# Patient Record
Sex: MALE | Race: WHITE | Employment: OTHER | URBAN - METROPOLITAN AREA
[De-identification: names, ages, dates, MRNs, and addresses within clinical notes are randomized per-mention and may not be internally consistent; named-entity substitution may affect disease eponyms.]

---

## 2020-01-01 ENCOUNTER — APPOINTMENT (OUTPATIENT)
Dept: OCCUPATIONAL THERAPY | Facility: CLINIC | Age: 85
DRG: 309 | End: 2020-01-01
Attending: HOSPITALIST
Payer: COMMERCIAL

## 2020-01-01 ENCOUNTER — HOSPITAL ENCOUNTER (INPATIENT)
Facility: CLINIC | Age: 85
LOS: 4 days | DRG: 284 | End: 2020-02-05
Attending: EMERGENCY MEDICINE | Admitting: INTERNAL MEDICINE
Payer: COMMERCIAL

## 2020-01-01 ENCOUNTER — DOCUMENTATION ONLY (OUTPATIENT)
Dept: OTHER | Facility: CLINIC | Age: 85
End: 2020-01-01

## 2020-01-01 ENCOUNTER — APPOINTMENT (OUTPATIENT)
Dept: CT IMAGING | Facility: CLINIC | Age: 85
DRG: 309 | End: 2020-01-01
Attending: EMERGENCY MEDICINE
Payer: COMMERCIAL

## 2020-01-01 ENCOUNTER — HOSPITAL ENCOUNTER (INPATIENT)
Facility: CLINIC | Age: 85
LOS: 3 days | Discharge: HOME HEALTH | DRG: 309 | End: 2020-01-20
Attending: EMERGENCY MEDICINE | Admitting: INTERNAL MEDICINE
Payer: COMMERCIAL

## 2020-01-01 ENCOUNTER — NURSE TRIAGE (OUTPATIENT)
Dept: NURSING | Facility: CLINIC | Age: 85
End: 2020-01-01

## 2020-01-01 ENCOUNTER — APPOINTMENT (OUTPATIENT)
Dept: CARDIOLOGY | Facility: CLINIC | Age: 85
DRG: 309 | End: 2020-01-01
Attending: HOSPITALIST
Payer: COMMERCIAL

## 2020-01-01 ENCOUNTER — SURGERY (OUTPATIENT)
Age: 85
End: 2020-01-01
Payer: COMMERCIAL

## 2020-01-01 ENCOUNTER — APPOINTMENT (OUTPATIENT)
Dept: CT IMAGING | Facility: CLINIC | Age: 85
DRG: 284 | End: 2020-01-01
Attending: EMERGENCY MEDICINE
Payer: COMMERCIAL

## 2020-01-01 ENCOUNTER — APPOINTMENT (OUTPATIENT)
Dept: CARDIOLOGY | Facility: CLINIC | Age: 85
DRG: 284 | End: 2020-01-01
Attending: INTERNAL MEDICINE
Payer: COMMERCIAL

## 2020-01-01 ENCOUNTER — HOSPITAL ENCOUNTER (EMERGENCY)
Facility: CLINIC | Age: 85
Discharge: HOME | DRG: 284 | End: 2020-01-30
Attending: EMERGENCY MEDICINE | Admitting: EMERGENCY MEDICINE
Payer: COMMERCIAL

## 2020-01-01 ENCOUNTER — APPOINTMENT (OUTPATIENT)
Dept: GENERAL RADIOLOGY | Facility: CLINIC | Age: 85
DRG: 284 | End: 2020-01-01
Attending: EMERGENCY MEDICINE
Payer: COMMERCIAL

## 2020-01-01 ENCOUNTER — APPOINTMENT (OUTPATIENT)
Dept: OCCUPATIONAL THERAPY | Facility: CLINIC | Age: 85
DRG: 309 | End: 2020-01-01
Payer: COMMERCIAL

## 2020-01-01 ENCOUNTER — APPOINTMENT (OUTPATIENT)
Dept: CT IMAGING | Facility: CLINIC | Age: 85
DRG: 284 | End: 2020-01-01
Attending: INTERNAL MEDICINE
Payer: COMMERCIAL

## 2020-01-01 ENCOUNTER — APPOINTMENT (OUTPATIENT)
Dept: GENERAL RADIOLOGY | Facility: CLINIC | Age: 85
DRG: 309 | End: 2020-01-01
Attending: HOSPITALIST
Payer: COMMERCIAL

## 2020-01-01 VITALS
TEMPERATURE: 97.6 F | OXYGEN SATURATION: 96 % | HEART RATE: 51 BPM | RESPIRATION RATE: 18 BRPM | DIASTOLIC BLOOD PRESSURE: 69 MMHG | SYSTOLIC BLOOD PRESSURE: 153 MMHG

## 2020-01-01 VITALS
BODY MASS INDEX: 18.71 KG/M2 | HEART RATE: 61 BPM | WEIGHT: 116.4 LBS | OXYGEN SATURATION: 87 % | TEMPERATURE: 98.7 F | RESPIRATION RATE: 6 BRPM | HEIGHT: 66 IN | DIASTOLIC BLOOD PRESSURE: 63 MMHG | SYSTOLIC BLOOD PRESSURE: 116 MMHG

## 2020-01-01 VITALS
SYSTOLIC BLOOD PRESSURE: 165 MMHG | BODY MASS INDEX: 18.54 KG/M2 | WEIGHT: 111.4 LBS | HEART RATE: 55 BPM | RESPIRATION RATE: 16 BRPM | TEMPERATURE: 97.7 F | OXYGEN SATURATION: 97 % | DIASTOLIC BLOOD PRESSURE: 78 MMHG

## 2020-01-01 DIAGNOSIS — F03.90 DEMENTIA WITHOUT BEHAVIORAL DISTURBANCE, UNSPECIFIED DEMENTIA TYPE: ICD-10-CM

## 2020-01-01 DIAGNOSIS — R79.89 ELEVATED TROPONIN: ICD-10-CM

## 2020-01-01 DIAGNOSIS — R53.81 PHYSICAL DECONDITIONING: Primary | ICD-10-CM

## 2020-01-01 DIAGNOSIS — I45.5 SINUS ARREST: ICD-10-CM

## 2020-01-01 DIAGNOSIS — R00.1 BRADYCARDIA: ICD-10-CM

## 2020-01-01 DIAGNOSIS — M62.81 GENERALIZED MUSCLE WEAKNESS: ICD-10-CM

## 2020-01-01 DIAGNOSIS — I49.5 SICK SINUS SYNDROME (H): ICD-10-CM

## 2020-01-01 DIAGNOSIS — R07.89 CHEST PRESSURE: ICD-10-CM

## 2020-01-01 DIAGNOSIS — I49.5 SINUS NODE DYSFUNCTION (H): ICD-10-CM

## 2020-01-01 LAB
ALBUMIN SERPL-MCNC: 2.8 G/DL (ref 3.4–5)
ALBUMIN SERPL-MCNC: 3.1 G/DL (ref 3.4–5)
ALBUMIN SERPL-MCNC: 3.6 G/DL (ref 3.4–5)
ALBUMIN UR-MCNC: NEGATIVE MG/DL
ALBUMIN UR-MCNC: NEGATIVE MG/DL
ALP SERPL-CCNC: 101 U/L (ref 40–150)
ALP SERPL-CCNC: 81 U/L (ref 40–150)
ALP SERPL-CCNC: 95 U/L (ref 40–150)
ALT SERPL W P-5'-P-CCNC: 19 U/L (ref 0–70)
ALT SERPL W P-5'-P-CCNC: 20 U/L (ref 0–70)
ALT SERPL W P-5'-P-CCNC: 30 U/L (ref 0–70)
ANION GAP SERPL CALCULATED.3IONS-SCNC: 2 MMOL/L (ref 3–14)
ANION GAP SERPL CALCULATED.3IONS-SCNC: 2 MMOL/L (ref 3–14)
ANION GAP SERPL CALCULATED.3IONS-SCNC: 3 MMOL/L (ref 3–14)
ANION GAP SERPL CALCULATED.3IONS-SCNC: 4 MMOL/L (ref 3–14)
ANION GAP SERPL CALCULATED.3IONS-SCNC: 5 MMOL/L (ref 3–14)
ANION GAP SERPL CALCULATED.3IONS-SCNC: 5 MMOL/L (ref 3–14)
APPEARANCE UR: CLEAR
APPEARANCE UR: CLEAR
AST SERPL W P-5'-P-CCNC: 12 U/L (ref 0–45)
AST SERPL W P-5'-P-CCNC: 24 U/L (ref 0–45)
AST SERPL W P-5'-P-CCNC: 25 U/L (ref 0–45)
BASOPHILS # BLD AUTO: 0 10E9/L (ref 0–0.2)
BASOPHILS # BLD AUTO: 0 10E9/L (ref 0–0.2)
BASOPHILS # BLD AUTO: 0.1 10E9/L (ref 0–0.2)
BASOPHILS NFR BLD AUTO: 0.2 %
BASOPHILS NFR BLD AUTO: 0.6 %
BASOPHILS NFR BLD AUTO: 1 %
BILIRUB SERPL-MCNC: 0.4 MG/DL (ref 0.2–1.3)
BILIRUB SERPL-MCNC: 0.6 MG/DL (ref 0.2–1.3)
BILIRUB SERPL-MCNC: 0.7 MG/DL (ref 0.2–1.3)
BILIRUB UR QL STRIP: NEGATIVE
BILIRUB UR QL STRIP: NEGATIVE
BUN SERPL-MCNC: 14 MG/DL (ref 7–30)
BUN SERPL-MCNC: 14 MG/DL (ref 7–30)
BUN SERPL-MCNC: 15 MG/DL (ref 7–30)
BUN SERPL-MCNC: 19 MG/DL (ref 7–30)
BUN SERPL-MCNC: 22 MG/DL (ref 7–30)
BUN SERPL-MCNC: 35 MG/DL (ref 7–30)
BUN SERPL-MCNC: 39 MG/DL (ref 7–30)
BUN SERPL-MCNC: 42 MG/DL (ref 7–30)
CALCIUM SERPL-MCNC: 8 MG/DL (ref 8.5–10.1)
CALCIUM SERPL-MCNC: 8.1 MG/DL (ref 8.5–10.1)
CALCIUM SERPL-MCNC: 8.2 MG/DL (ref 8.5–10.1)
CALCIUM SERPL-MCNC: 8.3 MG/DL (ref 8.5–10.1)
CALCIUM SERPL-MCNC: 8.3 MG/DL (ref 8.5–10.1)
CALCIUM SERPL-MCNC: 8.5 MG/DL (ref 8.5–10.1)
CALCIUM SERPL-MCNC: 8.7 MG/DL (ref 8.5–10.1)
CALCIUM SERPL-MCNC: 9 MG/DL (ref 8.5–10.1)
CHLORIDE SERPL-SCNC: 107 MMOL/L (ref 94–109)
CHLORIDE SERPL-SCNC: 108 MMOL/L (ref 94–109)
CHLORIDE SERPL-SCNC: 109 MMOL/L (ref 94–109)
CHLORIDE SERPL-SCNC: 109 MMOL/L (ref 94–109)
CHLORIDE SERPL-SCNC: 110 MMOL/L (ref 94–109)
CHLORIDE SERPL-SCNC: 111 MMOL/L (ref 94–109)
CHLORIDE SERPL-SCNC: 111 MMOL/L (ref 94–109)
CHLORIDE SERPL-SCNC: 113 MMOL/L (ref 94–109)
CK SERPL-CCNC: 141 U/L (ref 30–300)
CO2 SERPL-SCNC: 26 MMOL/L (ref 20–32)
CO2 SERPL-SCNC: 26 MMOL/L (ref 20–32)
CO2 SERPL-SCNC: 27 MMOL/L (ref 20–32)
CO2 SERPL-SCNC: 28 MMOL/L (ref 20–32)
CO2 SERPL-SCNC: 28 MMOL/L (ref 20–32)
CO2 SERPL-SCNC: 29 MMOL/L (ref 20–32)
CO2 SERPL-SCNC: 29 MMOL/L (ref 20–32)
CO2 SERPL-SCNC: 30 MMOL/L (ref 20–32)
COLOR UR AUTO: NORMAL
COLOR UR AUTO: YELLOW
CREAT SERPL-MCNC: 0.95 MG/DL (ref 0.66–1.25)
CREAT SERPL-MCNC: 1.04 MG/DL (ref 0.66–1.25)
CREAT SERPL-MCNC: 1.05 MG/DL (ref 0.66–1.25)
CREAT SERPL-MCNC: 1.1 MG/DL (ref 0.66–1.25)
CREAT SERPL-MCNC: 1.21 MG/DL (ref 0.66–1.25)
CREAT SERPL-MCNC: 1.38 MG/DL (ref 0.66–1.25)
CREAT SERPL-MCNC: 1.38 MG/DL (ref 0.66–1.25)
CREAT SERPL-MCNC: 1.95 MG/DL (ref 0.66–1.25)
DIFFERENTIAL METHOD BLD: ABNORMAL
EOSINOPHIL # BLD AUTO: 0.1 10E9/L (ref 0–0.7)
EOSINOPHIL # BLD AUTO: 0.2 10E9/L (ref 0–0.7)
EOSINOPHIL # BLD AUTO: 0.2 10E9/L (ref 0–0.7)
EOSINOPHIL NFR BLD AUTO: 1 %
EOSINOPHIL NFR BLD AUTO: 3 %
EOSINOPHIL NFR BLD AUTO: 4.6 %
ERYTHROCYTE [DISTWIDTH] IN BLOOD BY AUTOMATED COUNT: 13.2 % (ref 10–15)
ERYTHROCYTE [DISTWIDTH] IN BLOOD BY AUTOMATED COUNT: 13.3 % (ref 10–15)
ERYTHROCYTE [DISTWIDTH] IN BLOOD BY AUTOMATED COUNT: 13.4 % (ref 10–15)
ERYTHROCYTE [DISTWIDTH] IN BLOOD BY AUTOMATED COUNT: 13.4 % (ref 10–15)
ERYTHROCYTE [DISTWIDTH] IN BLOOD BY AUTOMATED COUNT: 13.6 % (ref 10–15)
GFR SERPL CREATININE-BSD FRML MDRD: 29 ML/MIN/{1.73_M2}
GFR SERPL CREATININE-BSD FRML MDRD: 44 ML/MIN/{1.73_M2}
GFR SERPL CREATININE-BSD FRML MDRD: 44 ML/MIN/{1.73_M2}
GFR SERPL CREATININE-BSD FRML MDRD: 52 ML/MIN/{1.73_M2}
GFR SERPL CREATININE-BSD FRML MDRD: 58 ML/MIN/{1.73_M2}
GFR SERPL CREATININE-BSD FRML MDRD: 61 ML/MIN/{1.73_M2}
GFR SERPL CREATININE-BSD FRML MDRD: 62 ML/MIN/{1.73_M2}
GFR SERPL CREATININE-BSD FRML MDRD: 69 ML/MIN/{1.73_M2}
GLUCOSE SERPL-MCNC: 112 MG/DL (ref 70–99)
GLUCOSE SERPL-MCNC: 114 MG/DL (ref 70–99)
GLUCOSE SERPL-MCNC: 122 MG/DL (ref 70–99)
GLUCOSE SERPL-MCNC: 137 MG/DL (ref 70–99)
GLUCOSE SERPL-MCNC: 81 MG/DL (ref 70–99)
GLUCOSE SERPL-MCNC: 85 MG/DL (ref 70–99)
GLUCOSE SERPL-MCNC: 91 MG/DL (ref 70–99)
GLUCOSE SERPL-MCNC: 93 MG/DL (ref 70–99)
GLUCOSE UR STRIP-MCNC: NEGATIVE MG/DL
GLUCOSE UR STRIP-MCNC: NEGATIVE MG/DL
HCT VFR BLD AUTO: 36.4 % (ref 40–53)
HCT VFR BLD AUTO: 36.5 % (ref 40–53)
HCT VFR BLD AUTO: 37.1 % (ref 40–53)
HCT VFR BLD AUTO: 38 % (ref 40–53)
HCT VFR BLD AUTO: 40.4 % (ref 40–53)
HGB BLD-MCNC: 11.9 G/DL (ref 13.3–17.7)
HGB BLD-MCNC: 12 G/DL (ref 13.3–17.7)
HGB BLD-MCNC: 12.6 G/DL (ref 13.3–17.7)
HGB BLD-MCNC: 13.2 G/DL (ref 13.3–17.7)
HGB UR QL STRIP: NEGATIVE
HGB UR QL STRIP: NEGATIVE
IMM GRANULOCYTES # BLD: 0 10E9/L (ref 0–0.4)
IMM GRANULOCYTES NFR BLD: 0.2 %
INTERPRETATION ECG - MUSE: NORMAL
KETONES UR STRIP-MCNC: NEGATIVE MG/DL
KETONES UR STRIP-MCNC: NEGATIVE MG/DL
LACTATE BLD-SCNC: 1.2 MMOL/L (ref 0.7–2)
LEUKOCYTE ESTERASE UR QL STRIP: NEGATIVE
LEUKOCYTE ESTERASE UR QL STRIP: NEGATIVE
LMWH PPP CHRO-ACNC: 0.13 IU/ML
LMWH PPP CHRO-ACNC: 0.31 IU/ML
LMWH PPP CHRO-ACNC: 0.32 IU/ML
LYMPHOCYTES # BLD AUTO: 1 10E9/L (ref 0.8–5.3)
LYMPHOCYTES # BLD AUTO: 1.1 10E9/L (ref 0.8–5.3)
LYMPHOCYTES # BLD AUTO: 1.6 10E9/L (ref 0.8–5.3)
LYMPHOCYTES NFR BLD AUTO: 10.9 %
LYMPHOCYTES NFR BLD AUTO: 20.9 %
LYMPHOCYTES NFR BLD AUTO: 30.2 %
MAGNESIUM SERPL-MCNC: 2.1 MG/DL (ref 1.6–2.3)
MAGNESIUM SERPL-MCNC: 2.3 MG/DL (ref 1.6–2.3)
MCH RBC QN AUTO: 30.7 PG (ref 26.5–33)
MCH RBC QN AUTO: 30.8 PG (ref 26.5–33)
MCH RBC QN AUTO: 30.9 PG (ref 26.5–33)
MCH RBC QN AUTO: 31.2 PG (ref 26.5–33)
MCH RBC QN AUTO: 31.3 PG (ref 26.5–33)
MCHC RBC AUTO-ENTMCNC: 32.3 G/DL (ref 31.5–36.5)
MCHC RBC AUTO-ENTMCNC: 32.6 G/DL (ref 31.5–36.5)
MCHC RBC AUTO-ENTMCNC: 32.7 G/DL (ref 31.5–36.5)
MCHC RBC AUTO-ENTMCNC: 33 G/DL (ref 31.5–36.5)
MCHC RBC AUTO-ENTMCNC: 33.2 G/DL (ref 31.5–36.5)
MCV RBC AUTO: 94 FL (ref 78–100)
MCV RBC AUTO: 95 FL (ref 78–100)
MONOCYTES # BLD AUTO: 0.5 10E9/L (ref 0–1.3)
MONOCYTES # BLD AUTO: 0.6 10E9/L (ref 0–1.3)
MONOCYTES # BLD AUTO: 0.7 10E9/L (ref 0–1.3)
MONOCYTES NFR BLD AUTO: 10.1 %
MONOCYTES NFR BLD AUTO: 10.4 %
MONOCYTES NFR BLD AUTO: 7.6 %
NEUTROPHILS # BLD AUTO: 2.8 10E9/L (ref 1.6–8.3)
NEUTROPHILS # BLD AUTO: 3.5 10E9/L (ref 1.6–8.3)
NEUTROPHILS # BLD AUTO: 7.2 10E9/L (ref 1.6–8.3)
NEUTROPHILS NFR BLD AUTO: 53.9 %
NEUTROPHILS NFR BLD AUTO: 64.9 %
NEUTROPHILS NFR BLD AUTO: 80.1 %
NITRATE UR QL: NEGATIVE
NITRATE UR QL: NEGATIVE
NRBC # BLD AUTO: 0 10*3/UL
NRBC # BLD AUTO: 0 10*3/UL
NRBC BLD AUTO-RTO: 0 /100
NRBC BLD AUTO-RTO: 0 /100
NT-PROBNP SERPL-MCNC: 2000 PG/ML (ref 0–1800)
PH UR STRIP: 6.5 PH (ref 5–7)
PH UR STRIP: 7 PH (ref 5–7)
PLATELET # BLD AUTO: 180 10E9/L (ref 150–450)
PLATELET # BLD AUTO: 182 10E9/L (ref 150–450)
PLATELET # BLD AUTO: 194 10E9/L (ref 150–450)
PLATELET # BLD AUTO: 203 10E9/L (ref 150–450)
PLATELET # BLD AUTO: 210 10E9/L (ref 150–450)
POTASSIUM SERPL-SCNC: 3.9 MMOL/L (ref 3.4–5.3)
POTASSIUM SERPL-SCNC: 4 MMOL/L (ref 3.4–5.3)
POTASSIUM SERPL-SCNC: 4.2 MMOL/L (ref 3.4–5.3)
POTASSIUM SERPL-SCNC: 4.4 MMOL/L (ref 3.4–5.3)
POTASSIUM SERPL-SCNC: 4.4 MMOL/L (ref 3.4–5.3)
POTASSIUM SERPL-SCNC: 4.6 MMOL/L (ref 3.4–5.3)
POTASSIUM SERPL-SCNC: 4.7 MMOL/L (ref 3.4–5.3)
POTASSIUM SERPL-SCNC: 4.7 MMOL/L (ref 3.4–5.3)
PROT SERPL-MCNC: 5.7 G/DL (ref 6.8–8.8)
PROT SERPL-MCNC: 6.2 G/DL (ref 6.8–8.8)
PROT SERPL-MCNC: 6.5 G/DL (ref 6.8–8.8)
RBC # BLD AUTO: 3.85 10E12/L (ref 4.4–5.9)
RBC # BLD AUTO: 3.87 10E12/L (ref 4.4–5.9)
RBC # BLD AUTO: 3.9 10E12/L (ref 4.4–5.9)
RBC # BLD AUTO: 4.02 10E12/L (ref 4.4–5.9)
RBC # BLD AUTO: 4.27 10E12/L (ref 4.4–5.9)
RBC #/AREA URNS AUTO: 0 /HPF (ref 0–2)
RBC #/AREA URNS AUTO: <1 /HPF (ref 0–2)
SODIUM SERPL-SCNC: 139 MMOL/L (ref 133–144)
SODIUM SERPL-SCNC: 140 MMOL/L (ref 133–144)
SODIUM SERPL-SCNC: 141 MMOL/L (ref 133–144)
SODIUM SERPL-SCNC: 142 MMOL/L (ref 133–144)
SODIUM SERPL-SCNC: 142 MMOL/L (ref 133–144)
SODIUM SERPL-SCNC: 144 MMOL/L (ref 133–144)
SOURCE: NORMAL
SOURCE: NORMAL
SP GR UR STRIP: 1.01 (ref 1–1.03)
SP GR UR STRIP: 1.01 (ref 1–1.03)
SQUAMOUS #/AREA URNS AUTO: <1 /HPF (ref 0–1)
TROPONIN I SERPL-MCNC: 5.42 UG/L (ref 0–0.04)
TROPONIN I SERPL-MCNC: 6.9 UG/L (ref 0–0.04)
TROPONIN I SERPL-MCNC: 7.89 UG/L (ref 0–0.04)
TROPONIN I SERPL-MCNC: <0.015 UG/L (ref 0–0.04)
TROPONIN I SERPL-MCNC: <0.015 UG/L (ref 0–0.04)
TSH SERPL DL<=0.005 MIU/L-ACNC: 2.37 MU/L (ref 0.4–4)
UROBILINOGEN UR STRIP-MCNC: NORMAL MG/DL (ref 0–2)
UROBILINOGEN UR STRIP-MCNC: NORMAL MG/DL (ref 0–2)
WBC # BLD AUTO: 5.2 10E9/L (ref 4–11)
WBC # BLD AUTO: 5.4 10E9/L (ref 4–11)
WBC # BLD AUTO: 7.1 10E9/L (ref 4–11)
WBC # BLD AUTO: 8.1 10E9/L (ref 4–11)
WBC # BLD AUTO: 8.9 10E9/L (ref 4–11)
WBC #/AREA URNS AUTO: <1 /HPF (ref 0–5)
WBC #/AREA URNS AUTO: <1 /HPF (ref 0–5)

## 2020-01-01 PROCEDURE — 25000132 ZZH RX MED GY IP 250 OP 250 PS 637: Performed by: INTERNAL MEDICINE

## 2020-01-01 PROCEDURE — 21000001 ZZH R&B HEART CARE

## 2020-01-01 PROCEDURE — 85025 COMPLETE CBC W/AUTO DIFF WBC: CPT | Performed by: EMERGENCY MEDICINE

## 2020-01-01 PROCEDURE — 5A1223Z PERFORMANCE OF CARDIAC PACING, CONTINUOUS: ICD-10-PCS | Performed by: INTERNAL MEDICINE

## 2020-01-01 PROCEDURE — 71046 X-RAY EXAM CHEST 2 VIEWS: CPT

## 2020-01-01 PROCEDURE — 83735 ASSAY OF MAGNESIUM: CPT | Performed by: HOSPITALIST

## 2020-01-01 PROCEDURE — 99223 1ST HOSP IP/OBS HIGH 75: CPT | Mod: AI | Performed by: INTERNAL MEDICINE

## 2020-01-01 PROCEDURE — 27210794 ZZH OR GENERAL SUPPLY STERILE: Performed by: INTERNAL MEDICINE

## 2020-01-01 PROCEDURE — 70450 CT HEAD/BRAIN W/O DYE: CPT

## 2020-01-01 PROCEDURE — 99222 1ST HOSP IP/OBS MODERATE 55: CPT | Mod: AI | Performed by: INTERNAL MEDICINE

## 2020-01-01 PROCEDURE — 84484 ASSAY OF TROPONIN QUANT: CPT | Performed by: EMERGENCY MEDICINE

## 2020-01-01 PROCEDURE — 85520 HEPARIN ASSAY: CPT | Performed by: INTERNAL MEDICINE

## 2020-01-01 PROCEDURE — 25000128 H RX IP 250 OP 636: Performed by: INTERNAL MEDICINE

## 2020-01-01 PROCEDURE — 93306 TTE W/DOPPLER COMPLETE: CPT | Mod: 26 | Performed by: INTERNAL MEDICINE

## 2020-01-01 PROCEDURE — 25000128 H RX IP 250 OP 636: Performed by: NURSE PRACTITIONER

## 2020-01-01 PROCEDURE — 25000132 ZZH RX MED GY IP 250 OP 250 PS 637: Performed by: NURSE PRACTITIONER

## 2020-01-01 PROCEDURE — 40000264 ECHOCARDIOGRAM LIMITED

## 2020-01-01 PROCEDURE — 99207 ZZC APP CREDIT; MD BILLING SHARED VISIT: CPT | Performed by: NURSE PRACTITIONER

## 2020-01-01 PROCEDURE — 99222 1ST HOSP IP/OBS MODERATE 55: CPT | Performed by: HOSPITALIST

## 2020-01-01 PROCEDURE — 25000132 ZZH RX MED GY IP 250 OP 250 PS 637: Performed by: HOSPITALIST

## 2020-01-01 PROCEDURE — 25800030 ZZH RX IP 258 OP 636: Performed by: INTERNAL MEDICINE

## 2020-01-01 PROCEDURE — 25000132 ZZH RX MED GY IP 250 OP 250 PS 637: Performed by: EMERGENCY MEDICINE

## 2020-01-01 PROCEDURE — 99233 SBSQ HOSP IP/OBS HIGH 50: CPT | Mod: 25 | Performed by: INTERNAL MEDICINE

## 2020-01-01 PROCEDURE — 96366 THER/PROPH/DIAG IV INF ADDON: CPT

## 2020-01-01 PROCEDURE — 80048 BASIC METABOLIC PNL TOTAL CA: CPT | Performed by: NURSE PRACTITIONER

## 2020-01-01 PROCEDURE — 99285 EMERGENCY DEPT VISIT HI MDM: CPT | Mod: 25

## 2020-01-01 PROCEDURE — 99232 SBSQ HOSP IP/OBS MODERATE 35: CPT | Performed by: INTERNAL MEDICINE

## 2020-01-01 PROCEDURE — 93005 ELECTROCARDIOGRAM TRACING: CPT

## 2020-01-01 PROCEDURE — 36415 COLL VENOUS BLD VENIPUNCTURE: CPT | Performed by: HOSPITALIST

## 2020-01-01 PROCEDURE — 36415 COLL VENOUS BLD VENIPUNCTURE: CPT | Performed by: INTERNAL MEDICINE

## 2020-01-01 PROCEDURE — 93325 DOPPLER ECHO COLOR FLOW MAPG: CPT | Mod: 26 | Performed by: INTERNAL MEDICINE

## 2020-01-01 PROCEDURE — 12000000 ZZH R&B MED SURG/OB

## 2020-01-01 PROCEDURE — 80048 BASIC METABOLIC PNL TOTAL CA: CPT | Performed by: INTERNAL MEDICINE

## 2020-01-01 PROCEDURE — 99207 ZZC CDG-CHARGE REQUIRED MANUAL ENTRY: CPT | Performed by: NURSE PRACTITIONER

## 2020-01-01 PROCEDURE — 33210 INSERT ELECTRD/PM CATH SNGL: CPT | Performed by: INTERNAL MEDICINE

## 2020-01-01 PROCEDURE — 83735 ASSAY OF MAGNESIUM: CPT | Performed by: NURSE PRACTITIONER

## 2020-01-01 PROCEDURE — 36415 COLL VENOUS BLD VENIPUNCTURE: CPT | Performed by: NURSE PRACTITIONER

## 2020-01-01 PROCEDURE — 20000003 ZZH R&B ICU

## 2020-01-01 PROCEDURE — 97165 OT EVAL LOW COMPLEX 30 MIN: CPT | Mod: GO

## 2020-01-01 PROCEDURE — 80053 COMPREHEN METABOLIC PANEL: CPT | Performed by: EMERGENCY MEDICINE

## 2020-01-01 PROCEDURE — 3E043XZ INTRODUCTION OF VASOPRESSOR INTO CENTRAL VEIN, PERCUTANEOUS APPROACH: ICD-10-PCS | Performed by: INTERNAL MEDICINE

## 2020-01-01 PROCEDURE — 25000125 ZZHC RX 250: Performed by: INTERNAL MEDICINE

## 2020-01-01 PROCEDURE — 81001 URINALYSIS AUTO W/SCOPE: CPT | Performed by: EMERGENCY MEDICINE

## 2020-01-01 PROCEDURE — 99233 SBSQ HOSP IP/OBS HIGH 50: CPT | Performed by: INTERNAL MEDICINE

## 2020-01-01 PROCEDURE — 97535 SELF CARE MNGMENT TRAINING: CPT | Mod: GO

## 2020-01-01 PROCEDURE — 99291 CRITICAL CARE FIRST HOUR: CPT | Performed by: NURSE PRACTITIONER

## 2020-01-01 PROCEDURE — C1730 CATH, EP, 19 OR FEW ELECT: HCPCS | Performed by: INTERNAL MEDICINE

## 2020-01-01 PROCEDURE — 99238 HOSP IP/OBS DSCHRG MGMT 30/<: CPT | Performed by: NURSE PRACTITIONER

## 2020-01-01 PROCEDURE — 97535 SELF CARE MNGMENT TRAINING: CPT | Mod: GO | Performed by: OCCUPATIONAL THERAPIST

## 2020-01-01 PROCEDURE — 83735 ASSAY OF MAGNESIUM: CPT | Performed by: INTERNAL MEDICINE

## 2020-01-01 PROCEDURE — 83605 ASSAY OF LACTIC ACID: CPT | Performed by: INTERNAL MEDICINE

## 2020-01-01 PROCEDURE — 80048 BASIC METABOLIC PNL TOTAL CA: CPT | Performed by: HOSPITALIST

## 2020-01-01 PROCEDURE — 82550 ASSAY OF CK (CPK): CPT | Performed by: INTERNAL MEDICINE

## 2020-01-01 PROCEDURE — 25800030 ZZH RX IP 258 OP 636: Performed by: HOSPITALIST

## 2020-01-01 PROCEDURE — 25000128 H RX IP 250 OP 636

## 2020-01-01 PROCEDURE — 93308 TTE F-UP OR LMTD: CPT | Mod: 26 | Performed by: INTERNAL MEDICINE

## 2020-01-01 PROCEDURE — 93321 DOPPLER ECHO F-UP/LMTD STD: CPT | Mod: 26 | Performed by: INTERNAL MEDICINE

## 2020-01-01 PROCEDURE — 80053 COMPREHEN METABOLIC PANEL: CPT | Performed by: HOSPITALIST

## 2020-01-01 PROCEDURE — 84443 ASSAY THYROID STIM HORMONE: CPT | Performed by: INTERNAL MEDICINE

## 2020-01-01 PROCEDURE — 93306 TTE W/DOPPLER COMPLETE: CPT

## 2020-01-01 PROCEDURE — 99221 1ST HOSP IP/OBS SF/LOW 40: CPT | Mod: 25 | Performed by: INTERNAL MEDICINE

## 2020-01-01 PROCEDURE — 83880 ASSAY OF NATRIURETIC PEPTIDE: CPT | Performed by: EMERGENCY MEDICINE

## 2020-01-01 PROCEDURE — 25000128 H RX IP 250 OP 636: Performed by: EMERGENCY MEDICINE

## 2020-01-01 PROCEDURE — 99239 HOSP IP/OBS DSCHRG MGMT >30: CPT | Performed by: HOSPITALIST

## 2020-01-01 PROCEDURE — 96365 THER/PROPH/DIAG IV INF INIT: CPT

## 2020-01-01 PROCEDURE — 99207 ZZC APP CREDIT; MD BILLING SHARED VISIT: CPT | Performed by: INTERNAL MEDICINE

## 2020-01-01 PROCEDURE — 33210 INSERT ELECTRD/PM CATH SNGL: CPT | Mod: GC | Performed by: INTERNAL MEDICINE

## 2020-01-01 PROCEDURE — 25000125 ZZHC RX 250: Performed by: NURSE PRACTITIONER

## 2020-01-01 PROCEDURE — 71275 CT ANGIOGRAPHY CHEST: CPT

## 2020-01-01 PROCEDURE — 99233 SBSQ HOSP IP/OBS HIGH 50: CPT | Performed by: HOSPITALIST

## 2020-01-01 PROCEDURE — 93005 ELECTROCARDIOGRAM TRACING: CPT | Mod: 76

## 2020-01-01 PROCEDURE — 80048 BASIC METABOLIC PNL TOTAL CA: CPT | Performed by: EMERGENCY MEDICINE

## 2020-01-01 PROCEDURE — 99207 ZZC CDG-MDM COMPONENT: MEETS MODERATE - UP CODED: CPT | Performed by: HOSPITALIST

## 2020-01-01 PROCEDURE — 84484 ASSAY OF TROPONIN QUANT: CPT | Performed by: INTERNAL MEDICINE

## 2020-01-01 PROCEDURE — 99207 ZZC NON-BILLABLE SERV PER CHARTING: CPT | Performed by: NURSE PRACTITIONER

## 2020-01-01 PROCEDURE — 99223 1ST HOSP IP/OBS HIGH 75: CPT | Performed by: NURSE PRACTITIONER

## 2020-01-01 PROCEDURE — 85027 COMPLETE CBC AUTOMATED: CPT | Performed by: INTERNAL MEDICINE

## 2020-01-01 PROCEDURE — 25500064 ZZH RX 255 OP 636: Performed by: INTERNAL MEDICINE

## 2020-01-01 PROCEDURE — 99222 1ST HOSP IP/OBS MODERATE 55: CPT | Mod: 25 | Performed by: INTERNAL MEDICINE

## 2020-01-01 PROCEDURE — 85018 HEMOGLOBIN: CPT | Performed by: HOSPITALIST

## 2020-01-01 PROCEDURE — 93010 ELECTROCARDIOGRAM REPORT: CPT | Mod: 76 | Performed by: INTERNAL MEDICINE

## 2020-01-01 PROCEDURE — 96376 TX/PRO/DX INJ SAME DRUG ADON: CPT

## 2020-01-01 RX ORDER — MORPHINE SULFATE 2 MG/ML
1-2 INJECTION, SOLUTION INTRAMUSCULAR; INTRAVENOUS
Status: DISCONTINUED | OUTPATIENT
Start: 2020-01-01 | End: 2020-02-06 | Stop reason: HOSPADM

## 2020-01-01 RX ORDER — ASPIRIN 81 MG/1
324 TABLET, CHEWABLE ORAL ONCE
Status: COMPLETED | OUTPATIENT
Start: 2020-01-01 | End: 2020-01-01

## 2020-01-01 RX ORDER — MORPHINE SULFATE 100 MG/5ML
10-20 SOLUTION ORAL
Status: DISCONTINUED | OUTPATIENT
Start: 2020-01-01 | End: 2020-02-06 | Stop reason: HOSPADM

## 2020-01-01 RX ORDER — BISACODYL 10 MG
10 SUPPOSITORY, RECTAL RECTAL DAILY PRN
Status: DISCONTINUED | OUTPATIENT
Start: 2020-01-01 | End: 2020-02-06 | Stop reason: HOSPADM

## 2020-01-01 RX ORDER — NALOXONE HYDROCHLORIDE 0.4 MG/ML
.1-.4 INJECTION, SOLUTION INTRAMUSCULAR; INTRAVENOUS; SUBCUTANEOUS
Status: DISCONTINUED | OUTPATIENT
Start: 2020-01-01 | End: 2020-01-01

## 2020-01-01 RX ORDER — HALOPERIDOL 5 MG/ML
1-2 INJECTION INTRAMUSCULAR
Status: DISCONTINUED | OUTPATIENT
Start: 2020-01-01 | End: 2020-02-06 | Stop reason: HOSPADM

## 2020-01-01 RX ORDER — ONDANSETRON 4 MG/1
4 TABLET, ORALLY DISINTEGRATING ORAL EVERY 6 HOURS PRN
Status: DISCONTINUED | OUTPATIENT
Start: 2020-01-01 | End: 2020-01-01

## 2020-01-01 RX ORDER — ACETAMINOPHEN 325 MG/1
650 TABLET ORAL ONCE
Status: COMPLETED | OUTPATIENT
Start: 2020-01-01 | End: 2020-01-01

## 2020-01-01 RX ORDER — IOPAMIDOL 755 MG/ML
80 INJECTION, SOLUTION INTRAVASCULAR ONCE
Status: DISCONTINUED | OUTPATIENT
Start: 2020-01-01 | End: 2020-01-01

## 2020-01-01 RX ORDER — AMOXICILLIN 250 MG
1 CAPSULE ORAL 2 TIMES DAILY
Status: DISCONTINUED | OUTPATIENT
Start: 2020-01-01 | End: 2020-01-01 | Stop reason: HOSPADM

## 2020-01-01 RX ORDER — FLUTICASONE PROPIONATE 50 MCG
1-2 SPRAY, SUSPENSION (ML) NASAL DAILY
COMMUNITY
Start: 2010-09-29

## 2020-01-01 RX ORDER — AMOXICILLIN 250 MG
2 CAPSULE ORAL 2 TIMES DAILY
Status: DISCONTINUED | OUTPATIENT
Start: 2020-01-01 | End: 2020-02-06 | Stop reason: HOSPADM

## 2020-01-01 RX ORDER — BISACODYL 5 MG
5 TABLET, DELAYED RELEASE (ENTERIC COATED) ORAL DAILY PRN
Status: DISCONTINUED | OUTPATIENT
Start: 2020-01-01 | End: 2020-02-06 | Stop reason: HOSPADM

## 2020-01-01 RX ORDER — ASPIRIN 81 MG/1
324 TABLET, CHEWABLE ORAL ONCE
Status: DISCONTINUED | OUTPATIENT
Start: 2020-01-01 | End: 2020-01-01 | Stop reason: CLARIF

## 2020-01-01 RX ORDER — ATROPINE SULFATE 0.1 MG/ML
0.4 INJECTION INTRAVENOUS
Status: DISCONTINUED | OUTPATIENT
Start: 2020-01-01 | End: 2020-01-01

## 2020-01-01 RX ORDER — POTASSIUM CL/LIDO/0.9 % NACL 10MEQ/0.1L
10 INTRAVENOUS SOLUTION, PIGGYBACK (ML) INTRAVENOUS
Status: DISCONTINUED | OUTPATIENT
Start: 2020-01-01 | End: 2020-01-01 | Stop reason: HOSPADM

## 2020-01-01 RX ORDER — MAGNESIUM SULFATE HEPTAHYDRATE 40 MG/ML
4 INJECTION, SOLUTION INTRAVENOUS EVERY 4 HOURS PRN
Status: DISCONTINUED | OUTPATIENT
Start: 2020-01-01 | End: 2020-01-01

## 2020-01-01 RX ORDER — ATROPINE SULFATE 0.1 MG/ML
0.5 INJECTION INTRAVENOUS
Status: DISCONTINUED | OUTPATIENT
Start: 2020-01-01 | End: 2020-01-01

## 2020-01-01 RX ORDER — NALOXONE HYDROCHLORIDE 0.4 MG/ML
.1-.4 INJECTION, SOLUTION INTRAMUSCULAR; INTRAVENOUS; SUBCUTANEOUS
Status: DISCONTINUED | OUTPATIENT
Start: 2020-01-01 | End: 2020-01-01 | Stop reason: HOSPADM

## 2020-01-01 RX ORDER — ONDANSETRON 4 MG/1
4 TABLET, ORALLY DISINTEGRATING ORAL EVERY 6 HOURS PRN
Status: DISCONTINUED | OUTPATIENT
Start: 2020-01-01 | End: 2020-01-01 | Stop reason: HOSPADM

## 2020-01-01 RX ORDER — HALOPERIDOL 2 MG/ML
1-2 SOLUTION ORAL EVERY 6 HOURS PRN
Status: DISCONTINUED | OUTPATIENT
Start: 2020-01-01 | End: 2020-02-06 | Stop reason: HOSPADM

## 2020-01-01 RX ORDER — NITROGLYCERIN 0.4 MG/1
0.4 TABLET SUBLINGUAL EVERY 5 MIN PRN
Status: DISCONTINUED | OUTPATIENT
Start: 2020-01-01 | End: 2020-01-01

## 2020-01-01 RX ORDER — ZOLPIDEM TARTRATE 5 MG/1
5 TABLET ORAL
COMMUNITY
Start: 2013-08-06 | End: 2020-01-01

## 2020-01-01 RX ORDER — MORPHINE SULFATE 10 MG/5ML
5-10 SOLUTION ORAL
Status: DISCONTINUED | OUTPATIENT
Start: 2020-01-01 | End: 2020-01-01

## 2020-01-01 RX ORDER — POTASSIUM CHLORIDE 1.5 G/1.58G
20-40 POWDER, FOR SOLUTION ORAL
Status: DISCONTINUED | OUTPATIENT
Start: 2020-01-01 | End: 2020-01-01 | Stop reason: HOSPADM

## 2020-01-01 RX ORDER — NALOXONE HYDROCHLORIDE 0.4 MG/ML
.1-.4 INJECTION, SOLUTION INTRAMUSCULAR; INTRAVENOUS; SUBCUTANEOUS
Status: DISCONTINUED | OUTPATIENT
Start: 2020-01-01 | End: 2020-02-06 | Stop reason: HOSPADM

## 2020-01-01 RX ORDER — PROCHLORPERAZINE 25 MG
12.5 SUPPOSITORY, RECTAL RECTAL EVERY 12 HOURS PRN
Status: DISCONTINUED | OUTPATIENT
Start: 2020-01-01 | End: 2020-02-06 | Stop reason: HOSPADM

## 2020-01-01 RX ORDER — ACETAMINOPHEN 500 MG
1000 TABLET ORAL ONCE
Status: COMPLETED | OUTPATIENT
Start: 2020-01-01 | End: 2020-01-01

## 2020-01-01 RX ORDER — BISACODYL 5 MG
10 TABLET, DELAYED RELEASE (ENTERIC COATED) ORAL DAILY PRN
Status: DISCONTINUED | OUTPATIENT
Start: 2020-01-01 | End: 2020-02-06 | Stop reason: HOSPADM

## 2020-01-01 RX ORDER — SIMVASTATIN 20 MG
20 TABLET ORAL EVERY EVENING
Status: DISCONTINUED | OUTPATIENT
Start: 2020-01-01 | End: 2020-01-01

## 2020-01-01 RX ORDER — LORAZEPAM 2 MG/ML
1-2 INJECTION INTRAMUSCULAR
Status: DISCONTINUED | OUTPATIENT
Start: 2020-01-01 | End: 2020-02-06 | Stop reason: HOSPADM

## 2020-01-01 RX ORDER — LORAZEPAM 0.5 MG/1
.5-1 TABLET ORAL
Status: DISCONTINUED | OUTPATIENT
Start: 2020-01-01 | End: 2020-01-01

## 2020-01-01 RX ORDER — POTASSIUM CHLORIDE 7.45 MG/ML
10 INJECTION INTRAVENOUS
Status: DISCONTINUED | OUTPATIENT
Start: 2020-01-01 | End: 2020-01-01 | Stop reason: HOSPADM

## 2020-01-01 RX ORDER — PROCHLORPERAZINE MALEATE 5 MG
5 TABLET ORAL EVERY 6 HOURS PRN
Status: DISCONTINUED | OUTPATIENT
Start: 2020-01-01 | End: 2020-02-06 | Stop reason: HOSPADM

## 2020-01-01 RX ORDER — AMOXICILLIN 250 MG
2 CAPSULE ORAL 2 TIMES DAILY
Status: DISCONTINUED | OUTPATIENT
Start: 2020-01-01 | End: 2020-01-01 | Stop reason: HOSPADM

## 2020-01-01 RX ORDER — SODIUM CHLORIDE 9 MG/ML
INJECTION, SOLUTION INTRAVENOUS CONTINUOUS
Status: DISCONTINUED | OUTPATIENT
Start: 2020-01-01 | End: 2020-01-01

## 2020-01-01 RX ORDER — POTASSIUM CHLORIDE 1500 MG/1
20-40 TABLET, EXTENDED RELEASE ORAL
Status: DISCONTINUED | OUTPATIENT
Start: 2020-01-01 | End: 2020-01-01 | Stop reason: HOSPADM

## 2020-01-01 RX ORDER — LIDOCAINE 40 MG/G
CREAM TOPICAL
Status: DISCONTINUED | OUTPATIENT
Start: 2020-01-01 | End: 2020-01-01 | Stop reason: HOSPADM

## 2020-01-01 RX ORDER — POTASSIUM CHLORIDE 29.8 MG/ML
20 INJECTION INTRAVENOUS
Status: DISCONTINUED | OUTPATIENT
Start: 2020-01-01 | End: 2020-01-01 | Stop reason: HOSPADM

## 2020-01-01 RX ORDER — LIDOCAINE 40 MG/G
CREAM TOPICAL
Status: DISCONTINUED | OUTPATIENT
Start: 2020-01-01 | End: 2020-02-06 | Stop reason: HOSPADM

## 2020-01-01 RX ORDER — POTASSIUM CHLORIDE 7.45 MG/ML
10 INJECTION INTRAVENOUS
Status: DISCONTINUED | OUTPATIENT
Start: 2020-01-01 | End: 2020-01-01

## 2020-01-01 RX ORDER — MINERAL OIL/HYDROPHIL PETROLAT
OINTMENT (GRAM) TOPICAL EVERY 8 HOURS PRN
Status: DISCONTINUED | OUTPATIENT
Start: 2020-01-01 | End: 2020-02-06 | Stop reason: HOSPADM

## 2020-01-01 RX ORDER — DOPAMINE HYDROCHLORIDE 160 MG/100ML
2-10 INJECTION, SOLUTION INTRAVENOUS CONTINUOUS
Status: DISCONTINUED | OUTPATIENT
Start: 2020-01-01 | End: 2020-01-01

## 2020-01-01 RX ORDER — ATROPINE SULFATE 10 MG/ML
1-2 SOLUTION/ DROPS OPHTHALMIC
Status: DISCONTINUED | OUTPATIENT
Start: 2020-01-01 | End: 2020-02-06 | Stop reason: HOSPADM

## 2020-01-01 RX ORDER — LORAZEPAM 2 MG/ML
.5-1 INJECTION INTRAMUSCULAR
Status: DISCONTINUED | OUTPATIENT
Start: 2020-01-01 | End: 2020-01-01

## 2020-01-01 RX ORDER — AMOXICILLIN 250 MG
1 CAPSULE ORAL 2 TIMES DAILY
Status: DISCONTINUED | OUTPATIENT
Start: 2020-01-01 | End: 2020-02-06 | Stop reason: HOSPADM

## 2020-01-01 RX ORDER — ONDANSETRON 2 MG/ML
4 INJECTION INTRAMUSCULAR; INTRAVENOUS EVERY 6 HOURS PRN
Status: DISCONTINUED | OUTPATIENT
Start: 2020-01-01 | End: 2020-01-01

## 2020-01-01 RX ORDER — ACETAMINOPHEN 500 MG
500-1000 TABLET ORAL EVERY 4 HOURS PRN
COMMUNITY

## 2020-01-01 RX ORDER — ACETAMINOPHEN 325 MG/1
650 TABLET ORAL EVERY 4 HOURS PRN
Status: DISCONTINUED | OUTPATIENT
Start: 2020-01-01 | End: 2020-01-01

## 2020-01-01 RX ORDER — POTASSIUM CHLORIDE 1.5 G/1.58G
20-40 POWDER, FOR SOLUTION ORAL
Status: DISCONTINUED | OUTPATIENT
Start: 2020-01-01 | End: 2020-01-01

## 2020-01-01 RX ORDER — ACETAMINOPHEN 325 MG/1
650 TABLET ORAL EVERY 6 HOURS PRN
Status: DISCONTINUED | OUTPATIENT
Start: 2020-01-01 | End: 2020-02-06 | Stop reason: HOSPADM

## 2020-01-01 RX ORDER — HALOPERIDOL 5 MG/ML
.5-1 INJECTION INTRAMUSCULAR
Status: DISCONTINUED | OUTPATIENT
Start: 2020-01-01 | End: 2020-01-01

## 2020-01-01 RX ORDER — IOPAMIDOL 755 MG/ML
80 INJECTION, SOLUTION INTRAVASCULAR ONCE
Status: COMPLETED | OUTPATIENT
Start: 2020-01-01 | End: 2020-01-01

## 2020-01-01 RX ORDER — OLANZAPINE 10 MG/2ML
2.5 INJECTION, POWDER, FOR SOLUTION INTRAMUSCULAR DAILY PRN
Status: DISCONTINUED | OUTPATIENT
Start: 2020-01-01 | End: 2020-02-06 | Stop reason: HOSPADM

## 2020-01-01 RX ORDER — POTASSIUM CL/LIDO/0.9 % NACL 10MEQ/0.1L
10 INTRAVENOUS SOLUTION, PIGGYBACK (ML) INTRAVENOUS
Status: DISCONTINUED | OUTPATIENT
Start: 2020-01-01 | End: 2020-01-01

## 2020-01-01 RX ORDER — ONDANSETRON 2 MG/ML
4 INJECTION INTRAMUSCULAR; INTRAVENOUS EVERY 6 HOURS PRN
Status: DISCONTINUED | OUTPATIENT
Start: 2020-01-01 | End: 2020-02-06 | Stop reason: HOSPADM

## 2020-01-01 RX ORDER — ONDANSETRON 2 MG/ML
4 INJECTION INTRAMUSCULAR; INTRAVENOUS EVERY 6 HOURS PRN
Status: DISCONTINUED | OUTPATIENT
Start: 2020-01-01 | End: 2020-01-01 | Stop reason: HOSPADM

## 2020-01-01 RX ORDER — ASPIRIN 325 MG
325 TABLET ORAL DAILY
Status: DISCONTINUED | OUTPATIENT
Start: 2020-01-01 | End: 2020-01-01

## 2020-01-01 RX ORDER — OXYCODONE HYDROCHLORIDE 5 MG/1
5 TABLET ORAL EVERY 6 HOURS PRN
Status: DISCONTINUED | OUTPATIENT
Start: 2020-01-01 | End: 2020-01-01

## 2020-01-01 RX ORDER — GLYCOPYRROLATE 0.2 MG/ML
.2-.4 INJECTION, SOLUTION INTRAMUSCULAR; INTRAVENOUS EVERY 4 HOURS PRN
Status: DISCONTINUED | OUTPATIENT
Start: 2020-01-01 | End: 2020-02-06 | Stop reason: HOSPADM

## 2020-01-01 RX ORDER — POTASSIUM CHLORIDE 29.8 MG/ML
20 INJECTION INTRAVENOUS
Status: DISCONTINUED | OUTPATIENT
Start: 2020-01-01 | End: 2020-01-01

## 2020-01-01 RX ORDER — HEPARIN SODIUM 10000 [USP'U]/100ML
700 INJECTION, SOLUTION INTRAVENOUS CONTINUOUS
Status: DISCONTINUED | OUTPATIENT
Start: 2020-01-01 | End: 2020-01-01

## 2020-01-01 RX ORDER — POTASSIUM CHLORIDE 1500 MG/1
20-40 TABLET, EXTENDED RELEASE ORAL
Status: DISCONTINUED | OUTPATIENT
Start: 2020-01-01 | End: 2020-01-01

## 2020-01-01 RX ORDER — NOREPINEPHRINE BITARTRATE 0.06 MG/ML
0.03-0.4 INJECTION, SOLUTION INTRAVENOUS CONTINUOUS
Status: DISCONTINUED | OUTPATIENT
Start: 2020-01-01 | End: 2020-01-01

## 2020-01-01 RX ORDER — ACETAMINOPHEN 325 MG/1
650 TABLET ORAL EVERY 4 HOURS PRN
Status: DISCONTINUED | OUTPATIENT
Start: 2020-01-01 | End: 2020-01-01 | Stop reason: HOSPADM

## 2020-01-01 RX ORDER — ACETAMINOPHEN 650 MG/1
650 SUPPOSITORY RECTAL EVERY 6 HOURS PRN
Status: DISCONTINUED | OUTPATIENT
Start: 2020-01-01 | End: 2020-02-06 | Stop reason: HOSPADM

## 2020-01-01 RX ORDER — MORPHINE SULFATE 100 MG/5ML
5-10 SOLUTION ORAL
Status: DISCONTINUED | OUTPATIENT
Start: 2020-01-01 | End: 2020-01-01

## 2020-01-01 RX ORDER — ONDANSETRON 4 MG/1
4 TABLET, ORALLY DISINTEGRATING ORAL EVERY 6 HOURS PRN
Status: DISCONTINUED | OUTPATIENT
Start: 2020-01-01 | End: 2020-02-06 | Stop reason: HOSPADM

## 2020-01-01 RX ORDER — BISACODYL 5 MG
15 TABLET, DELAYED RELEASE (ENTERIC COATED) ORAL DAILY PRN
Status: DISCONTINUED | OUTPATIENT
Start: 2020-01-01 | End: 2020-02-06 | Stop reason: HOSPADM

## 2020-01-01 RX ORDER — LORAZEPAM 2 MG/ML
1-2 CONCENTRATE ORAL
Status: DISCONTINUED | OUTPATIENT
Start: 2020-01-01 | End: 2020-02-06 | Stop reason: HOSPADM

## 2020-01-01 RX ADMIN — MORPHINE SULFATE 1 MG: 2 INJECTION, SOLUTION INTRAMUSCULAR; INTRAVENOUS at 06:08

## 2020-01-01 RX ADMIN — MORPHINE SULFATE 20 MG: 100 SOLUTION ORAL at 19:26

## 2020-01-01 RX ADMIN — HEPARIN SODIUM 600 UNITS/HR: 10000 INJECTION, SOLUTION INTRAVENOUS at 06:00

## 2020-01-01 RX ADMIN — LIDOCAINE HYDROCHLORIDE 10 ML: 10 INJECTION, SOLUTION EPIDURAL; INFILTRATION; INTRACAUDAL; PERINEURAL at 20:36

## 2020-01-01 RX ADMIN — MORPHINE SULFATE 20 MG: 100 SOLUTION ORAL at 16:49

## 2020-01-01 RX ADMIN — ATROPINE SULFATE 2 DROP: 10 SOLUTION/ DROPS OPHTHALMIC at 11:29

## 2020-01-01 RX ADMIN — HALOPERIDOL LACTATE 1 MG: 5 INJECTION, SOLUTION INTRAMUSCULAR at 09:41

## 2020-01-01 RX ADMIN — SENNOSIDES AND DOCUSATE SODIUM 2 TABLET: 8.6; 5 TABLET ORAL at 08:15

## 2020-01-01 RX ADMIN — MORPHINE SULFATE 20 MG: 100 SOLUTION ORAL at 04:30

## 2020-01-01 RX ADMIN — MORPHINE SULFATE 20 MG: 100 SOLUTION ORAL at 22:23

## 2020-01-01 RX ADMIN — MORPHINE SULFATE 20 MG: 100 SOLUTION ORAL at 10:02

## 2020-01-01 RX ADMIN — SENNOSIDES AND DOCUSATE SODIUM 1 TABLET: 8.6; 5 TABLET ORAL at 08:51

## 2020-01-01 RX ADMIN — QUETIAPINE 12.5 MG: 25 TABLET, FILM COATED ORAL at 18:09

## 2020-01-01 RX ADMIN — IOPAMIDOL 80 ML: 755 INJECTION, SOLUTION INTRAVENOUS at 20:47

## 2020-01-01 RX ADMIN — SODIUM CHLORIDE: 9 INJECTION, SOLUTION INTRAVENOUS at 10:16

## 2020-01-01 RX ADMIN — Medication 1500 UNITS: at 14:34

## 2020-01-01 RX ADMIN — MORPHINE SULFATE 20 MG: 100 SOLUTION ORAL at 21:35

## 2020-01-01 RX ADMIN — SENNOSIDES AND DOCUSATE SODIUM 1 TABLET: 8.6; 5 TABLET ORAL at 00:05

## 2020-01-01 RX ADMIN — SODIUM CHLORIDE 80 ML: 9 INJECTION, SOLUTION INTRAVENOUS at 20:46

## 2020-01-01 RX ADMIN — MORPHINE SULFATE 20 MG: 100 SOLUTION ORAL at 14:56

## 2020-01-01 RX ADMIN — Medication 3000 UNITS: at 05:59

## 2020-01-01 RX ADMIN — LORAZEPAM 1 MG: 2 INJECTION INTRAMUSCULAR; INTRAVENOUS at 11:30

## 2020-01-01 RX ADMIN — MORPHINE SULFATE 10 MG: 100 SOLUTION ORAL at 14:49

## 2020-01-01 RX ADMIN — Medication 0.03 MCG/KG/MIN: at 23:22

## 2020-01-01 RX ADMIN — HALOPERIDOL LACTATE 0.5 MG: 5 INJECTION, SOLUTION INTRAMUSCULAR at 01:45

## 2020-01-01 RX ADMIN — SENNOSIDES AND DOCUSATE SODIUM 1 TABLET: 8.6; 5 TABLET ORAL at 08:29

## 2020-01-01 RX ADMIN — Medication 1 MG: at 00:05

## 2020-01-01 RX ADMIN — ATROPINE SULFATE 2 DROP: 10 SOLUTION/ DROPS OPHTHALMIC at 09:51

## 2020-01-01 RX ADMIN — SODIUM CHLORIDE: 9 INJECTION, SOLUTION INTRAVENOUS at 23:27

## 2020-01-01 RX ADMIN — ASPIRIN 81 MG 324 MG: 81 TABLET ORAL at 04:16

## 2020-01-01 RX ADMIN — MORPHINE SULFATE 20 MG: 100 SOLUTION ORAL at 07:46

## 2020-01-01 RX ADMIN — SIMVASTATIN 20 MG: 20 TABLET, FILM COATED ORAL at 19:14

## 2020-01-01 RX ADMIN — MORPHINE SULFATE 1 MG: 2 INJECTION, SOLUTION INTRAMUSCULAR; INTRAVENOUS at 04:44

## 2020-01-01 RX ADMIN — MORPHINE SULFATE 20 MG: 100 SOLUTION ORAL at 02:17

## 2020-01-01 RX ADMIN — HEPARIN SODIUM 700 UNITS/HR: 10000 INJECTION, SOLUTION INTRAVENOUS at 13:07

## 2020-01-01 RX ADMIN — LORAZEPAM 0.5 MG: 2 INJECTION INTRAMUSCULAR; INTRAVENOUS at 03:10

## 2020-01-01 RX ADMIN — ACETAMINOPHEN 1000 MG: 500 TABLET, FILM COATED ORAL at 05:59

## 2020-01-01 RX ADMIN — ATROPINE SULFATE 2 DROP: 10 SOLUTION/ DROPS OPHTHALMIC at 17:48

## 2020-01-01 RX ADMIN — ATROPINE SULFATE 2 DROP: 10 SOLUTION/ DROPS OPHTHALMIC at 14:51

## 2020-01-01 RX ADMIN — SODIUM CHLORIDE: 9 INJECTION, SOLUTION INTRAVENOUS at 17:13

## 2020-01-01 RX ADMIN — ATROPINE SULFATE 0.5 MG: 0.1 INJECTION PARENTERAL at 18:50

## 2020-01-01 RX ADMIN — ACETAMINOPHEN 650 MG: 325 TABLET, FILM COATED ORAL at 21:12

## 2020-01-01 RX ADMIN — NITROGLYCERIN 0.4 MG: 0.4 TABLET SUBLINGUAL at 04:45

## 2020-01-01 RX ADMIN — MORPHINE SULFATE 20 MG: 100 SOLUTION ORAL at 15:26

## 2020-01-01 RX ADMIN — SENNOSIDES AND DOCUSATE SODIUM 1 TABLET: 8.6; 5 TABLET ORAL at 22:02

## 2020-01-01 RX ADMIN — SODIUM CHLORIDE: 9 INJECTION, SOLUTION INTRAVENOUS at 23:06

## 2020-01-01 RX ADMIN — QUETIAPINE 12.5 MG: 25 TABLET, FILM COATED ORAL at 00:15

## 2020-01-01 RX ADMIN — DOPAMINE HYDROCHLORIDE IN DEXTROSE 2.5 MCG/KG/MIN: 1.6 INJECTION, SOLUTION INTRAVENOUS at 23:27

## 2020-01-01 RX ADMIN — HUMAN ALBUMIN MICROSPHERES AND PERFLUTREN 9 ML: 10; .22 INJECTION, SOLUTION INTRAVENOUS at 14:26

## 2020-01-01 RX ADMIN — MORPHINE SULFATE 20 MG: 100 SOLUTION ORAL at 17:48

## 2020-01-01 RX ADMIN — ASPIRIN 325 MG ORAL TABLET 325 MG: 325 PILL ORAL at 08:27

## 2020-01-01 RX ADMIN — QUETIAPINE 25 MG: 25 TABLET ORAL at 22:23

## 2020-01-01 RX ADMIN — MORPHINE SULFATE 20 MG: 100 SOLUTION ORAL at 23:38

## 2020-01-01 RX ADMIN — Medication 1 MG: at 22:01

## 2020-01-01 RX ADMIN — ATROPINE SULFATE 2 DROP: 10 SOLUTION/ DROPS OPHTHALMIC at 12:36

## 2020-01-01 RX ADMIN — MORPHINE SULFATE 1 MG: 2 INJECTION, SOLUTION INTRAMUSCULAR; INTRAVENOUS at 13:27

## 2020-01-01 RX ADMIN — DOPAMINE HYDROCHLORIDE IN DEXTROSE 3.5 MCG/KG/MIN: 1.6 INJECTION, SOLUTION INTRAVENOUS at 09:46

## 2020-01-01 RX ADMIN — QUETIAPINE 12.5 MG: 25 TABLET, FILM COATED ORAL at 19:14

## 2020-01-01 RX ADMIN — SENNOSIDES AND DOCUSATE SODIUM 1 TABLET: 8.6; 5 TABLET ORAL at 08:58

## 2020-01-01 RX ADMIN — GLYCOPYRROLATE 0.2 MG: 0.2 INJECTION, SOLUTION INTRAMUSCULAR; INTRAVENOUS at 12:38

## 2020-01-01 ASSESSMENT — ACTIVITIES OF DAILY LIVING (ADL)
ADLS_ACUITY_SCORE: 13
ADLS_ACUITY_SCORE: 24
ADLS_ACUITY_SCORE: 16
ADLS_ACUITY_SCORE: 22
ADLS_ACUITY_SCORE: 20
ADLS_ACUITY_SCORE: 16
ADLS_ACUITY_SCORE: 14
ADLS_ACUITY_SCORE: 20
ADLS_ACUITY_SCORE: 22
ADLS_ACUITY_SCORE: 22
ADLS_ACUITY_SCORE: 24
ADLS_ACUITY_SCORE: 22
PREVIOUS_RESPONSIBILITIES: LAUNDRY
ADLS_ACUITY_SCORE: 20
ADLS_ACUITY_SCORE: 13
ADLS_ACUITY_SCORE: 22
ADLS_ACUITY_SCORE: 22
ADLS_ACUITY_SCORE: 20
ADLS_ACUITY_SCORE: 22
ADLS_ACUITY_SCORE: 20
ADLS_ACUITY_SCORE: 13
ADLS_ACUITY_SCORE: 24

## 2020-01-01 ASSESSMENT — ENCOUNTER SYMPTOMS
LIGHT-HEADEDNESS: 1
DIARRHEA: 0
FEVER: 0
VOMITING: 0
MYALGIAS: 0
DIAPHORESIS: 0
COUGH: 0
HEADACHES: 1
CHEST TIGHTNESS: 1
VOMITING: 0
COUGH: 0
SHORTNESS OF BREATH: 1
ABDOMINAL PAIN: 0
BLOOD IN STOOL: 0
FATIGUE: 1

## 2020-01-01 ASSESSMENT — MIFFLIN-ST. JEOR: SCORE: 1125.75

## 2020-01-17 PROBLEM — I45.5 SINUS PAUSE: Status: ACTIVE | Noted: 2020-01-01

## 2020-01-18 NOTE — PHARMACY-ADMISSION MEDICATION HISTORY
Pharmacy Medication History  Admission medication history interview status for the 1/17/2020  admission is complete. See EPIC admission navigator for prior to admission medications     Medication history sources: Patient's family/friend (spouse)  Medication history source reliability: Good  Adherence assessment: Good    Significant changes made to the medication list:  Removed ambien      Additional medication history information:        Medication reconciliation completed by provider prior to medication history? No    Time spent in this activity: 15min      Prior to Admission medications    Medication Sig Last Dose Taking? Auth Provider   acetaminophen (TYLENOL) 500 MG tablet Take 500-1,000 mg by mouth every 4 hours as needed 1/17/2020 at Unknown time Yes Unknown, Entered By History   aspirin 81 MG tablet Take by mouth daily 1/16/2020 at Unknown time Yes Reported, Patient   cholecalciferol (VITAMIN D3) 125 MCG (5000 UT) TABS tablet Take 5,000 Units by mouth daily 1/16/2020 at Unknown time Yes Unknown, Entered By History   fluticasone (FLONASE) 50 MCG/ACT nasal spray Spray 1-2 sprays in nostril daily as needed  Past Month at Unknown time Yes Unknown, Entered By History   lisinopril-hydrochlorothiazide (PRINZIDE,ZESTORETIC) 10-12.5 MG per tablet Take 1 tablet by mouth daily 1/16/2020 at Unknown time Yes Reported, Patient

## 2020-01-18 NOTE — PROGRESS NOTES
[facility]  Hospitalist Progress Note   01/18/2020          Assessment and Plan:       Nahun Han is a 89 year old male admitted on 1/17/2020 with lightheadedness.    Symptomatic sinus pauses.  Per chart review daughter had bought him as he was not feeling well in the past several days.  Initial EKG normal sinus rhythm with heart rate of 70.  Telemetry monitor with frequent sinus pauses.  PTA not on no AV bj blocking medication.  Overnight telemetry with sinus bradycardia.  Electrolytes within normal limits.  UA negative.  CT of head no acute pathology.  Telemetry monitoring.  We will check TSH, magnesium levels.  Chest x-ray to rule out any lung masses.  Echocardiogram for evaluation of heart function, valvular abnormalities.  EP consult requested, awaiting recommendation.    Hypertension.  Holding PTA lisinopril, hydrochlorothiazide given borderline blood pressures.    Severe dementia from Alzheimer's.  Patient alert oriented x1, does not know where he is, thinks it is 2006.  Lives at home with his wife, son.  Quite impulsive per floor team report, having sitter by bedside.  Will need PT, OT eval prior to discharge.  Minimize interruptions, frequent reorientation.  Avoid narcotics and benzodiazepines.  Discussed with patient no driving.     Orders Placed This Encounter      NPO per Anesthesia Guidelines for Procedure/Surgery Except for: Meds      DVT Prophylaxis: scd, ambulate out of bed.  Code Status: Full Code  Disposition: Expected discharge in 1 to 2 days pending clinical improvement.    Discussed with patient, bedside RN    Karin Ortega MD        Interval History:      Patient lying in bed.  Alert and oriented x1, unable to provide any history.  Per nursing report has been quite impulsive, has been placed on sitter since last night.  No nausea or vomiting.  Has been having sinus bradycardia on telemetry with pauses.       Physical Exam:        Physical Exam   Temp:  [97.7  F  (36.5  C)-97.9  F (36.6  C)] 97.8  F (36.6  C)  Pulse:  [39-84] 61  Heart Rate:  [] 43  Resp:  [14-27] 18  BP: ()/(34-89) 100/50  SpO2:  [92 %-100 %] 98 %    Intake/Output Summary (Last 24 hours) at 1/18/2020 1019  Last data filed at 1/18/2020 0851  Gross per 24 hour   Intake 731.25 ml   Output 450 ml   Net 281.25 ml       PHYSICAL EXAM  GENERAL: Patient is in no distress.  Awake, oriented x1.  HEENT: Oropharynx pink, moist. Pupils equal  HEART: Regular rate and rhythm. S1S2.  Bradycardia.  No murmurs.  LUNGS: Bilateral clear breath sounds, no wheezing no crackles.  NEURO: Moving all extremities, unable to follow commands.  EXTREMITIES: No pedal edema. 1 + peripheral pulses.  SKIN: Warm, dry.   PSYCHIATRY Cooperative       Medications:          senna-docusate  1 tablet Oral BID    Or     senna-docusate  2 tablet Oral BID     sodium chloride (PF)  3 mL Intracatheter Q8H     acetaminophen, lidocaine 4%, lidocaine (buffered or not buffered), melatonin, naloxone, ondansetron **OR** ondansetron, potassium chloride, potassium chloride with lidocaine, potassium chloride, potassium chloride, potassium chloride, sodium chloride (PF)         Data:      All new lab and imaging data was reviewed.

## 2020-01-18 NOTE — CONSULTS
[facility]  Palliative Care Consultation Note    Patient: Nahun Han  Date of Admission:  1/17/2020    Requesting Clinician / Team: Hospitalist  Reason for consult: Goals of care  Patient and family support    Recommendations:      Changed code status to DNR/DNI     Pt's health care directive clearly states this preference and confirmed by daughter (2nd alternate agent)      Started POLST today. Radha will show it to her mother and have it signed once her mother is aware of this form. Radha states that her mother supports patient wish for DNR/DNI      Please consult PT/OT to determine the level of care he requires after discharge    These recommendations have been discussed with ANNE-MARIE Geiger, text page to hospitalist Dr Ortega.      Thank you for the opportunity to participate in the care of this patient and family. Our team: will continue to follow.     During regular M-F work hours -- if you are not sure who specifically to contact -- please contact us by sending a text page to our team consult pager at [phone].    After regular work hours and on weekends/holidays, you can call our answering service at [phone]. Also, who's on call for us is available in [employer].       Assessments:  Nahun Han is a 89 year old male with advanced dementia admitted 1/17 with lightheadedness. He was found to have sinus pauses with junctional escape rhythm. He has been seen by cardiology and per their assessment this is likely an incidental finding and he would probably not benefit from a PPM considering his overall decline    I meet with daughter Radha today.   Nahun himself is in bed, awake, unable to participate in our conversation meaningfully due to cognitive impairment which seems chronic and close to his baseline.    We discuss Nahun' code status and treatment preferences. Radha confirms his wish to be DNR/DNI as stated in his health care directive. She also confirms that she  "agrees with cardiology's recommendation against placement of a pacemaker.    Radha is concerned about his current living arrangement. He lives independently in an apartment with his wife who has medical issues herself, but no cognitive impairment. Domingo is able to walk some distance and per Radha's description \"constantly in motion\". During my visit today he repeatedly tries to get out of bed to \"get [his] items\".     Radha describes that her mother is now open to discussing possible placement for Nahun.     Today, the patient was seen for:  Alzheimer's dementia  Junctional rhythm    Prognosis, Goals, & Planning:      Functional Status just prior to hospitalization: 1 (Restricted in physically strenuous activity but ambulatory and able to carry out work of a light or sedentary nature) cognitive impairment limits his ability to perform meaningful activities      Prognosis, Goals, and/or Advance Care Planning were addressed today: Yes        Summary/Comments: Radha is aware that dementia is a progressive illness that will eventually express itself in physical complications. She is worried that her mother is not understanding that.   Radha realizes that her father's prognosis is limited by his dementia, but that at this time he likely has months, possibly even years to live.   Radha has a copy of his health care directive, which repeatedly states that he would not want life-prolonging treatments in a situation as he is in now.       Patient's decision making preferences: unable to assess          Patient has decision-making capacity today for complex decisions: No            I have concerns about the patient/family's health literacy today: No Radha does express some concerns about her mother's understanding of the prognosis and natural course of dementia. However, Radha is very certain that her mother is in full agreement with Nahun' expressed wishes per his HCD           Patient has a completed Health Care Directive: Legally " designated health care agent: his wife with his son as first and Radha as second alternate     Radha has a copy which is not yet scanned into the chart      Code status: DNR/DNI changed today as per [facility] and conversation with Radha    Coping, Meaning, & Spirituality:   Mood, coping, and/or meaning in the context of serious illness were addressed today: No  Summary/Comments:     Social:     Living situation: lives independently in an apartment with his wife. [place] lived [place], but returned to their home because his wife didn't like living in an [place]. Their son now lives in their basement.     Key family / caregivers: son who lives with them. Radha appears to be doing most of the medical aspects of their care and spends Thursdays with Nahun.     Financial concerns: none expressed. Radha feels that her parents can afford living in a facility. They also have LTC insurance    Current in-home services: none    Of note, Nahun' wife was admitted last week and Nahun stayed at Radha's house during the days, since he needs 24h supervision at this time.     History of Present Illness:  History gathered today from: family/loved ones, medical chart  Radha outlines his limitations and current living situation as above.   She describes that Nahun lost significant weight prior to the stay at [place] and also started loosing weight again since they moved back into their home.     Nahun is unable to contribute at this time.     Key Palliative Symptom Data:  We are not helping to manage these symptoms currently in this patient.        ROS:  Unable to participate     Past Medical History:  Past Medical History:   Diagnosis Date     Hypertension      Renal disease     hx kidney stones        Past Surgical History:  Past Surgical History:   Procedure Laterality Date     GENITOURINARY SURGERY      kidney stone procedures     HERNIORRHAPHY INGUINAL  7/28/2014    Procedure: HERNIORRHAPHY INGUINAL;  Surgeon: Augie Block MD;   Location: [facility]     PROSTATE SURGERY  1993         Family History:  No family history on file.      Allergies:  No Known Allergies     Medications:  I have reviewed this patient's medication profile and medications from this hospitalization.     Physical Exam:  Vital Signs: Temp: 97.3  F (36.3  C) Temp src: Oral BP: 98/52 Pulse: 61 Heart Rate: (!) 44 Resp: 18 SpO2: 93 % O2 Device: None (Room air)        CONSTIT: awake, appears comfortable, cachectic  EENT: MMM, EOMI, no icterus  RESP: reg, nl effort  MSK:moves x4, strength seems intact  SKIN:  warm, no rash, no obvious lesions  NEURO: alert, oriented to self, not aware he is in a hospital  PSYCH: relatively bright affect      Data reviewed:  Recent imaging reviewed, my comments on pertinents:       Recent lab data reviewed, my comments on pertinents:       Patient seen for advanced dementia with progressive decline and likely incidental finding of sinus block. I counseled his daughter on treatment options, discussed his presumed preferences, offered support and clarified immediate treatment plan based on chart review and conversation. Billing based on complexity regarding advanced dementia with significant care needs and spouse with functional limitations.     Rosangela Martins MD  Palliative Medicine  Pager [phone]

## 2020-01-18 NOTE — ED PROVIDER NOTES
History   Chief Complaint:  Lightheadedness     HPI   History limited due to patient's history of Alzheimer's dementia. History supplemented by the patient's wife and son.    Nahun Han is a 89 year old male with a history of hypertension and who presents with lightheadedness. The patient's wife reports that throughout the day today the patient felt warm and was lightheaded intermittently. She tried to obtain a temperature but was unable to do so. They also noted the patient's blood pressure to be low at home as it was 89/46 however they state the blood pressure cuff was meant for his son who is much larger. Given the concern for illness, the patient was brought to the ED for evaluation. In the ED, the patient denies cough, vomiting, diarrhea, black or bloody stools, chest pain, and sick contacts. He does have a history of hypertension for which he takes lisinopril-hydrochlorothiazide though he did not take his dose today.    Allergies:  No known drug allergies    Medications:   Aspirin  Lisinopril-hydrochlorothiazide   Ambien    Past Medical History:    Essential hypertension, benign  Dementia in Alzheimer's disease  Basal cell cancer  Insomnia  Cerebral infarction   Chronic renal insufficiency, stage III  Syncope   Prostate cancer  Vertigo    Past Surgical History:    Kidney stone procedures  Herniorrhaphy inguinal  TURP     Family History:    Heart disease  Hypertension  Stroke    Social History:  Smoking status: Former smoker  Alcohol use: Yes    Review of Systems   Respiratory: Negative for cough.    Cardiovascular: Negative for chest pain.   Gastrointestinal: Negative for blood in stool, diarrhea and vomiting.   Neurological: Positive for light-headedness (intermittent).   All other systems reviewed and are negative.    ROS limited- patient has a history of dementia per family.    Physical Exam   Patient Vitals for the past 24 hrs:   BP Temp Temp src Pulse Heart Rate Resp SpO2   01/17/20 2200 123/60  -- -- (!) 46 (!) 43 20 94 %   01/17/20 2145 108/81 -- -- (!) 39 (!) 40 20 98 %   01/17/20 2115 112/54 -- -- 65 67 23 96 %   01/17/20 2100 137/47 -- -- (!) 44 66 20 99 %   01/17/20 2045 114/74 -- -- 67 68 14 98 %   01/17/20 2030 130/75 -- -- 69 65 20 99 %   01/17/20 2000 (!) 141/62 -- -- 69 67 18 99 %   01/17/20 1930 (!) 163/78 -- -- 78 103 27 --   01/17/20 1845 135/63 -- -- 74 -- -- 92 %   01/17/20 1830 130/70 -- -- 78 -- -- 98 %   01/17/20 1822 (!) 171/85 -- -- 84 -- -- 98 %   01/17/20 1821 (!) 183/89 97.7  F (36.5  C) Oral 81 -- 18 96 %     Physical Exam  General: Well-nourished, appears to be resting comfortably when I enter the room  Eyes: PERRL, conjunctivae pink no scleral icterus or conjunctival injection  ENT:  Moist mucus membranes, posterior oropharynx clear without erythema or exudates  Respiratory:  Lungs clear to auscultation bilaterally, no crackles/rubs/wheezes.  Good air movement  CV: Normal rate and rhythm, no murmurs/rubs/gallops  GI:  Abdomen soft and non-distended.  Normoactive BS.  No tenderness, guarding or rebound  Skin: Warm, dry.  No rashes or petechiae  Musculoskeletal: No peripheral edema or calf tenderness  Neuro: Alert and oriented to person/place/time.  PERRL, EOMI no nystagmus, no aphasia/facial droop/dysarthria, tongue midline, symmetric palatal elevation, normal strength at SCM/trapezius/BUE/BLE, normal coordination to FNF at BUE, gait deferred, negative romberg, sensation intact to LT over face/BUE/BLE  Psychiatric: Normal affect    Emergency Department Course   ECG (18:40:45):  Rate 75 bpm. IA interval 188. QRS duration 106. QT/QTc 402/448. P-R-T axes 65 -64 71. Normal sinus rhythm. Left anterior fascicular block. No significant change compared to prior EKG dated 01/17/2020. Interpreted at 1845 by Sepideh Jasmine MD.                ECG (20:18:35):  Rate 69 bpm. IA interval 166. QRS duration 102. QT/QTc 428/458. P-R-T axes 59 -32 34. Sinus rhythm with sinus arrhythmia. Left axis  deviation. Interpreted at 2032 by Sepideh Jasmine MD.    Imaging:  Radiographic findings were communicated with the patient and family who voiced understanding of the findings.    Head CT w/o Contrast:  1. No acute intracranial abnormality.  2. Stable chronic findings, as detailed.  As read by Radiology.    Laboratory:  Laboratory findings were communicated with the patient and family who voiced understanding of the findings.    CBC: HGB 13.2 (L) o/w WNL (WBC 5.2, )  CMP: GFR 58 (L), Protein Total 6.5 (L) o/w WNL (Creatinine 1.10)  Troponin: <0.015    UA with Microscopic: negative    Interventions:  Pacer pads placed and patient put on the portable monitor.    2112 Tylenol 650 mg PO    Emergency Department Course:  Past medical records, nursing notes, and vitals reviewed.   1820 I performed an exam of the patient and obtained history, as documented above.    1840 EKG obtained, results above.     IV inserted and blood drawn. The patient provided a urine sample here in the emergency department. This was sent for laboratory testing, findings above.     2015 I rechecked the patient. Asystolic pauses noted on rhythm strip, pacer pads placed on patient. Explained findings to the patient.    2022 I spoke with Dr. Rincon of cardiology regarding the patient.    2030 I spoke to Dr. Zamorano of the hospitalist service who accepts the patient for admission.     Findings and plan explained to the patient who consents to admission. Discussed the patient with Dr. Zamorano, who will admit the patient to a CICU bed for further monitoring, evaluation, and treatment.    Impression & Plan    Medical Decision Making:  Nahun Han is a 89 year old male who comes with generalized weakness and perhaps some lightheadedness without any other focal symptoms.  There was report of hypotension on the home blood pressure cuff, but here he is actually hypertensive and did not take his lisinopril hydrochlorothiazide.  I considered a broad  differential given no focal or specific symptoms.  EKG looks nonischemic.  He is not having chest pain or shortness of breath.  Head CT was obtained and was normal.  Sodium level and creatinine are reassuring.  No signs of infection.  No signs of GI bleed.  While on monitoring in the emergency department, he did have several recurrent pauses that appear to be sinus arrest.  He is not on any AV bj blocking agents and I do not see P waves and so I do not believe this is reversible third-degree heart block.  He was not hypotensive or lightheaded during these episodes.  He did not lose consciousness.  He was placed on a portable monitor with pacer pads should he require pacing.  EKG showed 1 of the episodes of sinus arrest.  fortunately he has been stable throughout but he will likely require pacemaker placement.  He lives independently with his wife and is full code and agreeable with having a pacemaker placed.  I spoke with Dr. Rincon on-call for cardiology who recommended that we monitor him in the cardiac intensive care unit overnight, emergency transvenous pacemaker placement with bridging by transthoracic pacing should he have persistent sinus arrest.  Otherwise, he can wait for permanent pacemaker placement tomorrow.  I spoke with Dr. Zamorano of the hospitalist service who graciously excepted the patient for admission.  He and his family were in agreement with the plan as well.    Critical Care time:  Was 35 minutes for this patient excluding procedures.    Diagnosis:    ICD-10-CM    1. Sinus arrest I45.5     intermittant   2. Generalized muscle weakness M62.81         Disposition:  Admitted to CICU in the care of Dr. Zamorano.        1/17/2020   Darron Tolbert, am serving as a scribe at 6:17 PM on 1/17/2020 to document services personally performed by Sepideh Jasmine MD based on my observations and the provider's statements to me.      Sepideh Jasmine MD  01/18/20 0032

## 2020-01-18 NOTE — ED NOTES
"[facility]  ED Nurse Handoff Report    ED Chief complaint: Generalized Weakness      ED Diagnosis:   Final diagnoses:   Sinus arrest - intermittant   Generalized muscle weakness       Code Status: Full Code    Allergies: No Known Allergies    Patient Story: pt reports to ED for evaluation after \"feeling lousy\" since this morning.     Focused Assessment:  Pt denies CP and SOB, pt states today he has just felt \"lousy\". Pt reports taking his BP at home and noticed \"my HR and BP were low\". Pt states he feels weak and fatigued more then usual. Pt is having sinus pause. Longest pause noted to be around 3 seconds.     Treatments and/or interventions provided: Monitoring  Patient's response to treatments and/or interventions: NA    To be done/followed up on inpatient unit:  Monitoring, pacemaker    Does this patient have any cognitive concerns?: NA    Activity level - Baseline/Home:  NA  Activity Level - Current:   NA    Patient's Preferred language: English   Needed?: No    Isolation: None  Infection: Not Applicable  Bariatric?: No    Vital Signs:   Vitals:    01/17/20 1830 01/17/20 1845 01/17/20 1930 01/17/20 2000   BP: 130/70 135/63 (!) 163/78 (!) 141/62   Pulse: 78 74 78 69   Resp:   27 18   Temp:       TempSrc:       SpO2: 98% 92%  99%       Cardiac Rhythm:Cardiac Rhythm: Normal sinus rhythm. Sinus pause noted.     Was the PSS-3 completed:   Yes  What interventions are required if any?  NA             Family Comments: Wife at bedside  OBS brochure/video discussed/provided to patient/family: N/A              Name of person given brochure if not patient: Na              Relationship to patient: NA    For the majority of the shift this patient's behavior was Green.   Behavioral interventions performed were NA.    ED NURSE PHONE NUMBER: *[id number]         "

## 2020-01-18 NOTE — H&P
Admitted:     01/17/2020      CHIEF COMPLAINT:  Lightheadedness.      HISTORY OF PRESENT ILLNESS:  Please note, this history is obtained from discussion with ER staff as well as the patient's wife at the bedside.  The patient is a poor historian.  Mr. Han is a pleasant 89-year-old gentleman with history of hypertension, distant history of prostate cancer after surgery, who presents today for evaluation of generally not feeling well.  The patient at the time of my interview reports feeling fine; however, his wife reported that actually during the last few days he has been less interactive.  This morning he seemed to be staring off into space during sometimes and less physically active.  He has not had a fall.  In general, he can ambulate independently.  He was brought to the emergency room for further evaluation and on cardiac monitor he was noted to be having sinus pauses upwards of 4-5 seconds.  He had an otherwise generally normal metabolic profile and hematologic profile as well as a normal urinalysis.  A CT scan of the head was obtained which showed no acute intracranial abnormality.  After discussion with Cardiac Electrophysiology, the patient is being admitted for consideration of a pacemaker.      REVIEW OF SYSTEMS:  A 10-point review of systems was conducted and is negative except as above in history of present illness.      PAST MEDICAL HISTORY:   1.  Hypertension.   2.  Distant history of prostate cancer with prostatectomy.   3.  Alzheimer's dementia.      MEDICATIONS PRIOR TO ADMISSION:   1.  Lisinopril/hydrochlorothiazide.   2.  Flonase.   3.  Aspirin.      SOCIAL HISTORY:  The patient is , lives independently with his wife and son.  Lifetime nonsmoker, very rare alcohol use.      FAMILY HISTORY:  Reviewed and noncontributory.      PHYSICAL EXAMINATION:   VITAL SIGNS:  Blood pressure 141/62, respirations 18, heart rate 67, temperature 97.7.   GENERAL:  This is a frail-appearing, elderly  gentleman.  He is alert, mildly confused, pleasantly so, oriented x 2.   HEENT:  He has moist mucous membranes, sclerae anicteric.  Tongue and uvula are midline.   NEUROLOGIC:  He has equal  strength.   HEART:  S1 and S2 present.  Rhythm is currently regular.   ABDOMEN:  Soft, nontender, nondistended.   EXTREMITIES:  No substantial peripheral edema is noted.      IMAGING:  CT scan of the head.  Impression is no acute intracranial abnormality.      LABORATORY DATA:  Urinalysis is bland.  Sodium 141, potassium 4.0, BUN 19, creatinine 1.1.  White blood cell count 5.2, hemoglobin 13.2, platelets are 180.      ASSESSMENT AND PLAN:     1.  This is a 89-year-old gentleman presenting with sinus pauses upwards of 4-5 seconds:  Plan is for admission to CICU for pacemaker implantation tomorrow.  He currently has transcutaneous pacers applied.   2.  History of hypertension:  His outpatient lisinopril/hydrochlorothiazide is currently on hold.      CODE STATUS:  Discussed with the patient and family.  He does not wish to be kept alive in the event of a persistent vegetative state, but he does wish to have resuscitation in the event of cardiac arrest. Code status is full code.      TOTAL TIME SPENT:  Greater than 50% of which involved counseling and coordination of care, is 45 minutes.         DANIEL YA MD             D: 2020   T: 2020   MT:       Name:     GINNY GONZALEZ   MRN:      [id number]        Account:      [id number]   :      1928        Admitted:     2020                   Document: [id number]       cc: Tony Paul MD

## 2020-01-18 NOTE — CONSULTS
[facility]    Cardiac Electrophysiology Consultation     Date of Admission:  1/17/2020  Date of Consult (When I saw the patient): 01/18/20    Assessment & Plan   Nahun Han is a 89 year old male who was admitted on 1/17/2020. I was asked to see the patient for sinus node dysfunction. Pt with severe dementia from Alzheimer's and getting worse past week per daughter. Pt unable to give history. Pt not feeling well in general this past several days and decided to bring him to ED as wife unable to take care of him. She was just hospitalized herself a few days ago as well. Initial ECG shows nsr at rate of 70 bpm. While on tele noted to have frequent sinus pauses up to several seconds. Nl lytes and not on any AVN blocker drug. Overnight in severe sinus gonzalo with junctional escape in the 40's. bp normal with sbp in 110's.    Sinus node dysfunction from intrinsic conduction disease and likely to be incidental finding. Doubtful that pt's cognitive decline is from it. Normally, I would recommend a ppm for patient but given his progressive cognitive decline due to intrinsic Alzheimer I am unsure if ppm would do anything to improve quality of life. Junctional rhythm can be quite stable for months. Daughter is quite concern about severity of his cognitive decline and that her mom is not able to take of him.    Given pt's progressive cognitive decline and is unlikely to improve with a ppm I would highly recommend palliative or comfort care consult. Pt can be discharged to home from my perspective.    Db Nunez    Code Status    Full Code    Primary Care Physician   Tony Paul    Unable to obtain a history from the patient due to confusion    Past Medical History   I have reviewed this patient's medical history and updated it with pertinent information if needed.   Past Medical History:   Diagnosis Date     Hypertension      Renal disease     hx kidney stones       Past Surgical History   I have  reviewed this patient's surgical history and updated it with pertinent information if needed.  Past Surgical History:   Procedure Laterality Date     GENITOURINARY SURGERY      kidney stone procedures     HERNIORRHAPHY INGUINAL  7/28/2014    Procedure: HERNIORRHAPHY INGUINAL;  Surgeon: Augie Block MD;  [facility]     PROSTATE SURGERY  1993       Prior to Admission Medications   Prior to Admission Medications   Prescriptions Last Dose Informant Patient Reported? Taking?   acetaminophen (TYLENOL) 500 MG tablet 1/17/2020 at Unknown time  Yes Yes   Sig: Take 500-1,000 mg by mouth every 4 hours as needed   aspirin 81 MG tablet 1/16/2020 at Unknown time  Yes Yes   Sig: Take by mouth daily   cholecalciferol (VITAMIN D3) 125 MCG (5000 UT) TABS tablet 1/16/2020 at Unknown time  Yes Yes   Sig: Take 5,000 Units by mouth daily   fluticasone (FLONASE) 50 MCG/ACT nasal spray Past Month at Unknown time  Yes Yes   Sig: Spray 1-2 sprays in nostril daily as needed    lisinopril-hydrochlorothiazide (PRINZIDE,ZESTORETIC) 10-12.5 MG per tablet 1/16/2020 at Unknown time  Yes Yes   Sig: Take 1 tablet by mouth daily      Facility-Administered Medications: None     Allergies   No Known Allergies    Social History   I have reviewed this patient's social history and updated it with pertinent information if needed. Nahun Han  reports that he has quit smoking. He does not have any smokeless tobacco history on file. He reports current alcohol use. He reports that he does not use drugs.    Family History   I have reviewed this patient's family history and updated it with pertinent information if needed.   No family history on file.    Review of Systems   Comprehensive review of systems was performed with pertinent positives and negatives listed in assessment and plan section.    Physical Exam   Temp: 97.8  F (36.6  C) Temp src: Oral BP: 103/68 Pulse: 61 Heart Rate: (!) 39 Resp: 16 SpO2: 98 % O2 Device: None (Room air)    Vital  Signs with Ranges  Temp:  [97.7  F (36.5  C)-97.9  F (36.6  C)] 97.8  F (36.6  C)  Pulse:  [39-84] 61  Heart Rate:  [] 39  Resp:  [14-27] 16  BP: ()/(34-89) 103/68  SpO2:  [92 %-100 %] 98 %  0 lbs 0 oz    Constitutional: awake, alert, cooperative, no apparent distress, and appears stated age  Eyes: Lids and lashes normal, pupils equal, round, extra ocular muscles intact, sclera clear, conjunctiva normal  ENT: Normocephalic, without obvious abnormality, atraumatic, sinuses nontender on palpation, external ears without lesions, oral pharynx with moist mucous membranes, tonsils without erythema or exudates, gums normal and good dentition.  Hematologic / Lymphatic: no cervical lymphadenopathy  Respiratory: No increased work of breathing, good air exchange, clear to auscultation bilaterally, no crackles or wheezing  Cardiovascular: bradycardic with regular rhythm  GI: No scars, normal bowel sounds, soft, non-distended, non-tender, no masses palpated, no hepatosplenomegally  Skin: no bruising or bleeding  Musculoskeletal: There is no redness, warmth, or swelling of the joints.  Full range of motion noted. l.  Neurologic: na  Neuropsychiatric: Level of consciousness: alert / normal    Data   I personally reviewed all recent ECGs and images.  Results for orders placed or performed during the hospital encounter of 01/17/20 (from the past 24 hour(s))   EKG 12-lead, tracing only   Result Value Ref Range    Interpretation ECG Click View Image link to view waveform and result    CBC with platelets differential   Result Value Ref Range    WBC 5.2 4.0 - 11.0 10e9/L    RBC Count 4.27 (L) 4.4 - 5.9 10e12/L    Hemoglobin 13.2 (L) 13.3 - 17.7 g/dL    Hematocrit 40.4 40.0 - 53.0 %    MCV 95 78 - 100 fl    MCH 30.9 26.5 - 33.0 pg    MCHC 32.7 31.5 - 36.5 g/dL    RDW 13.2 10.0 - 15.0 %    Platelet Count 180 150 - 450 10e9/L    Diff Method Automated Method     % Neutrophils 53.9 %    % Lymphocytes 30.2 %    % Monocytes 10.1 %     % Eosinophils 4.6 %    % Basophils 1.0 %    % Immature Granulocytes 0.2 %    Nucleated RBCs 0 0 /100    Absolute Neutrophil 2.8 1.6 - 8.3 10e9/L    Absolute Lymphocytes 1.6 0.8 - 5.3 10e9/L    Absolute Monocytes 0.5 0.0 - 1.3 10e9/L    Absolute Eosinophils 0.2 0.0 - 0.7 10e9/L    Absolute Basophils 0.1 0.0 - 0.2 10e9/L    Abs Immature Granulocytes 0.0 0 - 0.4 10e9/L    Absolute Nucleated RBC 0.0    Comprehensive metabolic panel   Result Value Ref Range    Sodium 141 133 - 144 mmol/L    Potassium 4.0 3.4 - 5.3 mmol/L    Chloride 107 94 - 109 mmol/L    Carbon Dioxide 29 20 - 32 mmol/L    Anion Gap 5 3 - 14 mmol/L    Glucose 93 70 - 99 mg/dL    Urea Nitrogen 19 7 - 30 mg/dL    Creatinine 1.10 0.66 - 1.25 mg/dL    GFR Estimate 58 (L) >60 mL/min/[1.73_m2]    GFR Estimate If Black 67 >60 mL/min/[1.73_m2]    Calcium 9.0 8.5 - 10.1 mg/dL    Bilirubin Total 0.4 0.2 - 1.3 mg/dL    Albumin 3.6 3.4 - 5.0 g/dL    Protein Total 6.5 (L) 6.8 - 8.8 g/dL    Alkaline Phosphatase 95 40 - 150 U/L    ALT 19 0 - 70 U/L    AST 12 0 - 45 U/L   Troponin I   Result Value Ref Range    Troponin I ES <0.015 0.000 - 0.045 ug/L   CT Head w/o Contrast    Narrative    CT SCAN OF THE HEAD WITHOUT CONTRAST   1/17/2020 7:07 PM     HISTORY:  Altered mental status, unexplained.    TECHNIQUE:  Axial images of the head and coronal reformations without  IV contrast material. Radiation dose for this scan was reduced using  automated exposure control, adjustment of the mA and/or kV according  to patient size, or iterative reconstruction technique.    COMPARISON: CT head dated 10/6/2011.    FINDINGS:  Moderate global brain parenchymal volume loss, as before.  Unchanged focal hypodensity within the right centrum semiovale,  similar to prior study and representing either a chronic infarct or  focal small vessel ischemic change. Minimal hypoattenuation in the  periventricular/deep cerebral white matter elsewhere likely represents  chronic small vessel ischemic  disease. Prominence of the extra-axial  spaces in the right more than left posterior fossa, unchanged. The  ventricles are normal in size and configuration. No acute intracranial  hemorrhage, mass lesion, mass effect or shift/herniation. Scattered  intracranial vascular calcifications primarily involving the carotid  siphons and left vertebral artery.    Small retention cysts in the left sphenoid and left maxillary sinuses  are noted. Paranasal sinuses are otherwise free of significant  disease. Orbits appear within normal limits. Mastoid and middle ear  cavities are clear. Advanced degenerative changes of the right  temporomandibular joint.      Impression    IMPRESSION:  1. No acute intracranial abnormality.  2. Stable chronic findings, as detailed.    BRO DE LA GARZA MD   UA with Microscopic   Result Value Ref Range    Color Urine Light Yellow     Appearance Urine Clear     Glucose Urine Negative NEG^Negative mg/dL    Bilirubin Urine Negative NEG^Negative    Ketones Urine Negative NEG^Negative mg/dL    Specific Gravity Urine 1.012 1.003 - 1.035    Blood Urine Negative NEG^Negative    pH Urine 6.5 5.0 - 7.0 pH    Protein Albumin Urine Negative NEG^Negative mg/dL    Urobilinogen mg/dL Normal 0.0 - 2.0 mg/dL    Nitrite Urine Negative NEG^Negative    Leukocyte Esterase Urine Negative NEG^Negative    Source Midstream Urine     WBC Urine <1 0 - 5 /HPF    RBC Urine <1 0 - 2 /HPF    Squamous Epithelial /HPF Urine <1 0 - 1 /HPF   EKG 12-lead, tracing only   Result Value Ref Range    Interpretation ECG Click View Image link to view waveform and result    Basic metabolic panel   Result Value Ref Range    Sodium 144 133 - 144 mmol/L    Potassium 3.9 3.4 - 5.3 mmol/L    Chloride 110 (H) 94 - 109 mmol/L    Carbon Dioxide 30 20 - 32 mmol/L    Anion Gap 4 3 - 14 mmol/L    Glucose 85 70 - 99 mg/dL    Urea Nitrogen 22 7 - 30 mg/dL    Creatinine 1.38 (H) 0.66 - 1.25 mg/dL    GFR Estimate 44 (L) >60 mL/min/[1.73_m2]    GFR Estimate  If Black 51 (L) >60 mL/min/[1.73_m2]    Calcium 8.3 (L) 8.5 - 10.1 mg/dL   CK total   Result Value Ref Range    CK Total 141 30 - 300 U/L   Magnesium   Result Value Ref Range    Magnesium 2.1 1.6 - 2.3 mg/dL   TSH with free T4 reflex   Result Value Ref Range    TSH 2.37 0.40 - 4.00 mU/L   EKG 12-lead, tracing only   Result Value Ref Range    Interpretation ECG Click View Image link to view waveform and result    Echocardiogram Complete    Narrative    [id number]  [id number]  [id number]  [id number]^TINY^SIMONE           [facility]  Echocardiography Laboratory  [address] [place] [address]        Name: GINNY GONZALEZ  MRN: [id number]  : 1928  Study Date: 2020 10:41 AM  Age: 89 yrs  Gender: Male  Patient Location: [facility]  Reason For Study: Dizziness  Ordering Physician: SIMONE FRANKS  Referring Physician: Tony Paul  Performed By: Colleen Alicea     BSA: 1.6 m2  Height: 65 in  Weight: 119 lb  HR: 46  BP: 103/68 mmHg  _____________________________________________________________________________  __        Procedure  Complete Portable Echo Adult.  _____________________________________________________________________________  __        Interpretation Summary     Mild aortic root dilatation.  The ascending aorta is Mildly dilated.  The visual ejection fraction is estimated at 55-60%.  The left atrium is mildly dilated.  There is mild (1+) tricuspid regurgitation.  Right ventricular systolic pressure is normal.  There is mild (1+) aortic regurgitation.  _____________________________________________________________________________  __        Left Ventricle  The left ventricle is normal in size. There is normal left ventricular wall  thickness. The visual ejection fraction is estimated at 55-60%. Diastolic  function not assessed due to atrial fibrillation.     Right Ventricle  The right ventricle is normal in structure, function and size.     Atria  The left atrium is  mildly dilated. Right atrial size is normal.     Mitral Valve  The mitral valve leaflets appear thickened, but open well. There is trace  mitral regurgitation.        Tricuspid Valve  Tricuspid leaflets are thickened. There is mild (1+) tricuspid regurgitation.  Right ventricular systolic pressure is normal.     Aortic Valve  There is moderate trileaflet aortic sclerosis. There is mild (1+) aortic  regurgitation.     Pulmonic Valve  There is trace pulmonic valvular regurgitation.     Vessels  Mild aortic root dilatation. The ascending aorta is Mildly dilated. The  inferior vena cava is normal.     Pericardium  There is no pericardial effusion.        Rhythm  The rhythm was atrial fibrillation.  _____________________________________________________________________________  __  MMode/2D Measurements & Calculations  IVSd: 1.0 cm     LVIDd: 3.2 cm  LVIDs: 1.9 cm  LVPWd: 1.1 cm  FS: 40.3 %  LV mass(C)d: 98.6 grams  LV mass(C)dI: 62.1 grams/m2  Ao root diam: 4.2 cm  LA dimension: 4.2 cm  asc Aorta Diam: 3.9 cm  LA/Ao: 0.98  LVOT diam: 2.4 cm  LVOT area: 4.7 cm2  RWT: 0.68        Doppler Measurements & Calculations  MV E max alexandra: 87.8 cm/sec  MV A max alexandra: 24.7 cm/sec  MV E/A: 3.5  MV dec slope: 448.3 cm/sec2  AI P1/2t: 835.1 msec  LV V1 max PG: 3.8 mmHg  LV V1 max: 97.0 cm/sec  LV V1 VTI: 19.7 cm  SV(LVOT): 91.8 ml  SI(LVOT): 57.9 ml/m2  PA acc time: 0.11 sec  PI end-d alexandra: 82.5 cm/sec     TR max alexandra: 251.1 cm/sec  TR max P.8 mmHg  E/E' av.6  Lateral E/e': 13.9  Medial E/e': 11.2           _____________________________________________________________________________  __           Report approved by: Hung Hernandez 2020 11:53 AM

## 2020-01-18 NOTE — ED NOTES
Writer noticed on monitor that pt had HR of 67 but having intervals of sinus pause. Pt reports no symptoms. MD Cho notified and pt hooked up to Zoll monitor, cardiac monitoring, NIBP and Sa02.

## 2020-01-19 NOTE — PROGRESS NOTES
01/19/20 1000   Quick Adds   Type of Visit Initial Occupational Therapy Evaluation   Living Environment   Lives With spouse;child(jaspreet), adult   Living Arrangements house  (rambler style home )   Home Accessibility stairs to enter home   Number of Stairs, Main Entrance 2   Living Environment Comment Son lives in the basement. Spouse recently hospitalized and can provide limited physical assistance. Daughter present during session and able to provide PLOF infromation. Per daughter, plans for patient to dischage to home with family assistance.    Self-Care   Usual Activity Tolerance good   Current Activity Tolerance moderate   Equipment Currently Used at Home walker, standard   Functional Level   Ambulation 1-->assistive equipment   Transferring 0-->independent   Toileting 0-->independent   Bathing 0-->independent   Dressing 0-->independent   Fall history within last six months yes   Number of times patient has fallen within last six months 2   General Information   Onset of Illness/Injury or Date of Surgery - Date 01/17/20   Referring Physician Karin Ortega MD   Patient/Family Goals Statement Home    Additional Occupational Profile Info/Pertinent History of Current Problem Per chart: Mr. Han is a pleasant 89-year-old gentleman with history of hypertension, distant history of prostate cancer after surgery, who presents today for evaluation of generally not feeling well.  The patient at the time of my interview reports feeling fine; however, his wife reported that actually during the last few days he has been less interactive.  This morning he seemed to be staring off into space during sometimes and less physically active.  He has not had a fall.  In general, he can ambulate independently.  He was brought to the emergency room for further evaluation and on cardiac monitor he was noted to be having sinus pauses upwards of 4-5 seconds.  He had an otherwise generally normal metabolic profile and hematologic  profile as well as a normal urinalysis.  A CT scan of the head was obtained which showed no acute intracranial abnormality.  After discussion with Cardiac Electrophysiology, the patient is being admitted for consideration of a pacemaker.    Cognitive Status Examination   Level of Consciousness alert   Cognitive Comment  Alzheimer's dementia per chart.    Sensory Examination   Sensory Quick Adds No deficits were identified   Pain Assessment   Patient Currently in Pain No   Transfer Skills   Transfer Transfer Safety Analysis Bed/Chair;Transfer Skill: Stand to Sit;Transfer Safety Analysis Sit/Stand   Transfer Skill: Bed to Chair/Chair to Bed   Level of [place]: Bed to Chair contact guard   Physical Assist/Nonphysical Assist: Bed to Chair verbal cues;set-up required;1 person assist   Transfer Skill: Sit to Stand   Level of [place]: Sit/Stand stand-by assist   Physical Assist/Nonphysical Assist: Sit/Stand verbal cues;set-up required   Toilet Transfer   Toilet Transfer Toilet Transfer Safety Analysis;Toilet Transfer Skill   Transfer Skill: Toilet Transfer   Level of [place]: Toilet contact guard   Physical Assist/Nonphysical Assist: Toilet 1 person assist;verbal cues;set-up required   Lower Body Dressing   Level of [place]: Dress Lower Body contact guard   Physical Assist/Nonphysical Assist: Dress Lower Body set-up required;verbal cues;1 person assist   Instrumental Activities of Daily Living (IADL)   Previous Responsibilities laundry   IADL Comments Spouse and family assists in IADLs (medication, finances). Son assists in laundry. Spouse is responsible for meal preparation.    General Therapy Interventions   Planned Therapy Interventions ADL retraining;cognition;transfer training   Clinical Impression   Criteria for Skilled Therapeutic Interventions Met yes, treatment indicated   OT Diagnosis Decreased independence for ADLs    Influenced by the following impairments Decreased independence for ADLs   "  Assessment of Occupational Performance 1-3 Performance Deficits   Identified Performance Deficits Decreased independence for ADLs (dressing, bathing, toileting)   Clinical Decision Making (Complexity) Low complexity   Therapy Frequency Daily   Predicted Duration of Therapy Intervention (days/wks) 3 days    Anticipated Discharge Disposition Home with Assist   Risks and Benefits of Treatment have been explained. Yes   Patient, Family & other staff in agreement with plan of care Yes   [facility] TM \"6 Clicks\"   2016, Trustees of [facility], under license to [employer].  All rights reserved.   6 Clicks Short Forms Daily Activity Inpatient Short Form   [facility]  \"6 Clicks\" Daily Activity Inpatient Short Form   1. Putting on and taking off regular lower body clothing? 3 - A Little   2. Bathing (including washing, rinsing, drying)? 3 - A Little   3. Toileting, which includes using toilet, bedpan or urinal? 3 - A Little   4. Putting on and taking off regular upper body clothing? 4 - None   5. Taking care of personal grooming such as brushing teeth? 3 - A Little   6. Eating meals? 4 - None   Daily Activity Raw Score (Score out of 24.Lower scores equate to lower levels of function) 20   Total Evaluation Time   Total Evaluation Time (Minutes) 8     "

## 2020-01-19 NOTE — PROGRESS NOTES
[facility]  Hospitalist Progress Note   01/19/2020          Assessment and Plan:       Nahun Han is a 89 year old male admitted on 1/17/2020 with lightheadedness.    Symptomatic sinus pauses, bradycardia with-SA node dysfunction.  Per chart review daughter had bought him as he was not feeling well in the past several days.  Initial EKG normal sinus rhythm with heart rate of 70.  Telemetry monitor with frequent sinus pauses.  PTA not on no AV bj blocking medication.  Telemetry with sinus bradycardia with heart rate to 20s, sinus pauses..  Electrolytes within normal limits.  TSH normal.  UA negative.   CT of head no acute pathology.  Chest x-ray no acute pathology.  Echocardiogram Mild aortic root dilatation.  Estimated EF 55 to 60%, mild tricuspid regurgitation, mild aortic regurgitation.  Evaluated by EP, impression sinus node dysfunction from intrinsic conduction disease unlikely to be an incidental finding, unsure if pacemaker placement would do anything to improve quality of life.  Recommended palliative comfort care consult, discharge home.  Appreciate recommendation.  Had RRT's yesterday for bradycardia.  At length discussion with patient's daughter today, agrees with holding off from pacemaker placement at this time.  Had met with palliative team, opted for DNR/DNI.  I have signed POLST today, extensive discussion on transition plans, offered comfort care-patient's daughter open to discussing with hospice team in the presence of her brother and mother.  Hospice consult requested.    Severe dementia from Alzheimer's.  Deconditioning from senile fragility  Patient alert oriented x1.  Used to be in an assisted living facility, his wife did not like it and has subsequently transition to home with his son in October 2019.  Quite impulsive per floor team report, sitter by bedside discontinued this morning.  PT, OT ongoing  Minimize interruptions, frequent reorientation.  Avoid narcotics  and benzodiazepines.    Acute kidney injury likely competent of dehydration, diuretic use.  On review of records in care everywhere baseline creatinine around 1, GFR greater than 60.  Presented with creatinine of 1.10, peaked to 1.95.  BMP today pending, hold PTA lisinopril, hydrochlorothiazide.  Gentle hydration with normal saline at 75 mL/h.  Recheck BMP a.m., avoid nephrotoxic drugs.    Hypertension.  Holding PTA lisinopril, hydrochlorothiazide given borderline blood pressures.     Orders Placed This Encounter      Regular Diet Adult      DVT Prophylaxis: scd, ambulate out of bed.  Code Status: DNR/DNI.  Goals of care discussion as above.  Disposition: Expected discharge in 1 to 2 days pending clinical improvement.    Discussed with patient, daughter by the bedside, bedside RN, patient work on the floor.  Total time greater than 35 minutes and greater than 70% of the time spent bedside discussing transition plan, goals of care..    Karin Ortega MD        Interval History:      Patient lying in bed.  Alert and oriented x1, unable to provide any history.  Per nursing report has been quite impulsive, sitter discontinued this morning.  No nausea or vomiting.    Has been having sinus bradycardia with HR 20s , RRT yesterday.  Tolerating oral diet, ambulating with assistance.       Physical Exam:        Physical Exam   Temp:  [97.1  F (36.2  C)-97.6  F (36.4  C)] 97.1  F (36.2  C)  Pulse:  [45-77] 63  Heart Rate:  [27-65] 65  Resp:  [16-22] 22  BP: ()/(45-98) 128/71  SpO2:  [92 %-98 %] 97 %    Intake/Output Summary (Last 24 hours) at 1/18/2020 1019  Last data filed at 1/18/2020 0851  Gross per 24 hour   Intake 731.25 ml   Output 450 ml   Net 281.25 ml       PHYSICAL EXAM  GENERAL: Patient is in no distress.  Awake, oriented x1.  HEENT: Oropharynx pink, moist. Pupils equal  HEART: Regular rate and rhythm. S1S2.  Bradycardia.  No murmurs.  LUNGS: Bilateral clear breath sounds, no wheezing no crackles.  NEURO:  Moving all extremities, unable to follow commands.  EXTREMITIES: No pedal edema. 1 + peripheral pulses.  SKIN: Warm, dry.   PSYCHIATRY Cooperative       Medications:          QUEtiapine  12.5 mg Oral Once     senna-docusate  1 tablet Oral BID    Or     senna-docusate  2 tablet Oral BID     sodium chloride (PF)  3 mL Intracatheter Q8H     acetaminophen, atropine, lidocaine 4%, lidocaine (buffered or not buffered), melatonin, naloxone, ondansetron **OR** ondansetron, potassium chloride, potassium chloride with lidocaine, potassium chloride, potassium chloride, potassium chloride, sodium chloride (PF)         Data:      All new lab and imaging data was reviewed.

## 2020-01-19 NOTE — PROGRESS NOTES
Pt had a short episode of AFib RVR into 120's then HR decreased down to bradycardia into 20's. Reported feeling dizzy. Provider updated. Will continue to monitor closely.

## 2020-01-19 NOTE — PROGRESS NOTES
House [facility] brief note:    Was paged by nursing regarding new onset a-fib RVR with HR up to 120s; however, spontaneously converted with noted sinus pauses following.  After review of cardiology consultation, pt not a candidate for PPM and family in agreement.  Given severe bradycardia with noted sinus pauses difficult to initiate rate controlling agents for new onset a-fib RVR.  Fortunately, pt's BP remained stable, asymptomatic, and spontaneously converted.  Noted electrolytes WNL in addition to TSH.  Pt afebrile.  Will defer any further management or workup at this time.  Please contact [facility] if pt becomes acutely symptomatic from arrhythmias and will likely contact family at that time to determine further plan of care.    Hubert Queen, SARAH, CNP  [email or web address]    No charge.

## 2020-01-19 NOTE — CODE/RAPID RESPONSE
[facility]    House [facility] RRT Note  1/18/2020   Time Called: 2254    RRT called for: Bradycardia    Assessment & Plan     Transient symptomatic junctional bradycardia.  - Upon arrival, pt sitting up in bed, awake, alert, in no apparent distress, with noted telemetry a-fib HR 30s-40s and recent SBP 90s.  Prior to arrival, pt's BP had been 60s-70s when asleep with noted HR in the 20s.  Pt reports transient dizziness with HR in the 20s; however, quickly resolves with increased HR/BP.  Pt reports no nausea, dizziness, SOB, chest pain.     INTERVENTIONS:  - Stat EKG  - Stat Mg, BMP  - Will order atropine PRN for symptomatic bradycardia  - Noted EP noted, no plan for PPM as suspect current arrhythmia is chronic    At the end of the RRT pt resting in bed, in no apparent distress, improved BP.    Discussed with and defer further cares to nursing and hospitalist.      Interval History     Nahun Han is a 89 year old male who was admitted on 1/17/2020 for lightheadedness.    Medical history significant for: HTN, prostate cancer s/p prostatectomy, Alzheimer's dementia    Code Status: DNR/DNI    Allergies   No Known Allergies    Physical Exam   Vital Signs with Ranges:  Temp:  [97.3  F (36.3  C)-97.9  F (36.6  C)] 97.6  F (36.4  C)  Pulse:  [49-61] 61  Heart Rate:  [27-49] 45  Resp:  [16-22] 22  BP: ()/(34-75) 129/64  SpO2:  [93 %-100 %] 98 %  I/O last 3 completed shifts:  In: 1396.25 [P.O.:540; I.V.:856.25]  Out: 550 [Urine:550]    Constitutional: Pt sitting up in bed, awake, alert, very pleasant, in no apparent distress  Pulmonary: In no apparent respiratory distress, clear to auscultation bilaterally, no crackles or wheezes noted  Cardiovascular: Bradycardic, irregular rate and rhythm, normal S1S2, no murmur, rub or gallop noted  GI: Flat  Skin/Integumen: Warm, dry  Neuro: Awake, alert, clear speech  Psych:  Calm  Extremities: No peripheral edema    Data     EKG:  Interpreted by Hubert Queen,  APRN CNP  Time reviewed: 2310  Symptoms at time of EKG: None   Rhythm: Junctional bradycardia  Rate: 40-50  Axis: Left Axis Deviation  Ectopy: premature junctional contraction  Conduction: normal  ST Segments/ T Waves: No ST-T wave changes and No acute ischemic changes  Q Waves: none  Comparison to prior: Unchanged from 0854    Clinical Impression: junctional rhythm    Comprehensive Metabolic Panel:  Recent Labs   Lab 01/18/20  2335  01/17/20  1848      < > 141   POTASSIUM 4.7   < > 4.0   CHLORIDE 109   < > 107   CO2 26   < > 29   ANIONGAP 5   < > 5   *   < > 93   BUN 42*   < > 19   CR 1.95*   < > 1.10   GFRESTIMATED 29*   < > 58*   GFRESTBLACK 34*   < > 67   ASHISH 8.3*   < > 9.0   MAG 2.3   < >  --    PROTTOTAL  --   --  6.5*   ALBUMIN  --   --  3.6   BILITOTAL  --   --  0.4   ALKPHOS  --   --  95   AST  --   --  12   ALT  --   --  19    < > = values in this interval not displayed.     Time Spent on this Encounter   I spent 5 minutes on the unit/floor managing the care of Nahun PUJA Han. Over 50% of my time was spent counseling the patient and/or coordinating care regarding services listed in this note.    SARAH [facility]

## 2020-01-19 NOTE — PLAN OF CARE
A&O to self only. RA. Tele is Junctional rhythm in 20's-60's- mostly asymptomatic. Confused. RRT called for low BP/HR which resolved on its own. SW consult today. Continue to monitor.

## 2020-01-19 NOTE — PLAN OF CARE
Alert with intermittent lethargy, oriented to self and occasionally place. Baseline dementia. Tele w/ EKG verification: junctional bradycardia, HR 27-50's. Asymptomatic. Despite low HR, all other VSS, room air. Denies CP and SOB. Echo and chest x-ray done today. EP consulted for possible pacemaker, per cardiology and family agreement to not proceed with pacemaker d/t age and dementia. Palliative care and SW consulted. Patient code status changed to DNR/DNI w/ family in agreement. Plan for social work, PT and OT consults for discharge planning.

## 2020-01-19 NOTE — PROGRESS NOTES
2300: RRT called- Pt HR in 20's, BP was 60's-70's SBP. Pt mostly asymptomatic. VS improved without intervention. Provider ordered PRN atropine, 12 lead EKG and labs. Continue to monitor closely.

## 2020-01-19 NOTE — PROGRESS NOTES
Cardiac Electrophysiology Progress Note          Assessment and Plan:   Sinus node disease with intermittent junctional escape in the 30's, unclear how symptomatic pt is due to advanced dementia. Pt was noted to have a brief episode of AF with rate of 120's last night followed by severe sinus gonzalo with junctional escape. AF is not too uncommon in his age group and that worsening sinus gonzalo is expected upon cessation.     Pt was evaluated by palliative team and is awaiting hospice consult as well. This is a difficult issue as it would be quite simple in placing a ppm but unlikely to alter the natural progression of his advanced dementia but may prevent potential lightheadedness or syncope should pt experience AF then have significant post conversion sinus pauses. Without AF sinus gonzalo likely would be stable. If proceed with a traditional ppm would either need generator implanted subpectorially as pt likes to pick on things and would be at high risk of twiddle the generator dislodging the leads. The other option would be a leadless pacemaker. I personally would not recommend it but if family wished for ppm will arrange it. P    Agree in not treating AF as it will make sinus function worse. Not candidate for oac. Please call for questions.                 Interval History:   Had a brief episode of AF with RVR              Review of Systems:   Na due to dementia           Physical Exam:   Blood pressure 116/42, pulse (!) 37, temperature 97.9  F (36.6  C), temperature source Oral, resp. rate 18, weight 49.9 kg (109 lb 14.4 oz), SpO2 92 %.          Intake/Output Summary (Last 24 hours) at 1/19/2020 1145  Last data filed at 1/19/2020 0900  Gross per 24 hour   Intake 910 ml   Output 350 ml   Net 560 ml       Constitutional:   NAD   Skin:   Warm and dry   Head:   Nontraumatic   Neck:   Supple, symmetrical, trachea midline, no adenopathy, thyroid symmetric, not enlarged and no tenderness, skin normal   Lungs:   normal    Cardiovascular:   bradycardic with regular rhythm    Abdomen:   Benign   Extremities and Back:   Symmetric, no curvature, spinous processes are non-tender on palpation, paraspinous muscles are non-tender on palpation, no costal vertebral tenderness   Neurological:   Grossly nonfocal            Medications:       QUEtiapine  12.5 mg Oral Once     senna-docusate  1 tablet Oral BID    Or     senna-docusate  2 tablet Oral BID     sodium chloride (PF)  3 mL Intracatheter Q8H     Admission on 10/06/2011, Discharged on 10/06/2011   Component Date Value Ref Range Status     WBC 10/06/2011 4.4  4.0 - 11.0 10e9/L Final     RBC Count 10/06/2011 4.37* 4.4 - 5.9 10e12/L Final     Hemoglobin 10/06/2011 13.7  13.3 - 17.7 g/dL Final     Hematocrit 10/06/2011 38.6* 40.0 - 53.0 % Final     MCV 10/06/2011 88  78 - 100 fl Final     MCH 10/06/2011 31.4  26.5 - 33.0 pg Final     MCHC 10/06/2011 35.5  31.5 - 36.5 g/dL Final     RDW 10/06/2011 12.5  10.0 - 15.0 % Final     Platelet Count 10/06/2011 98* 150 - 450 10e9/L Final     Diff Method 10/06/2011 Automated Method   Final     % Neutrophils 10/06/2011 81.4* 40 - 75 % Final     % Lymphocytes 10/06/2011 11.8* 20 - 48 % Final     % Monocytes 10/06/2011 6.1  0 - 12 % Final     % Eosinophils 10/06/2011 0.0  0 - 6 % Final     % Basophils 10/06/2011 0.5  0 - 2 % Final     % Immature Granulocytes 10/06/2011 0.2  0 - 0.4 % Final     Absolute Neutrophil 10/06/2011 3.6  1.6 - 8.3 10e9/L Final     Absolute Lymphocytes 10/06/2011 0.5* 0.8 - 5.3 10e9/L Final     Absolute Monocytes 10/06/2011 0.3  0.0 - 1.3 10e9/L Final     Absolute Eosinophils 10/06/2011 0.0  0.0 - 0.7 10e9/L Final     Absolute Basophils 10/06/2011 0.0  0.0 - 0.2 10e9/L Final     Abs Immature Granulocytes 10/06/2011 0.0  0 - 0.03 10e9/L Final     Sodium 10/06/2011 137  133 - 144 mmol/L Final     Potassium 10/06/2011 3.3* 3.4 - 5.3 mmol/L Final     Chloride 10/06/2011 103  94 - 109 mmol/L Final     Carbon Dioxide 10/06/2011 26  20 -  32 mmol/L Final     Anion Gap 10/06/2011 8  6 - 17 mmol/L Final     Glucose 10/06/2011 126* 60 - 99 mg/dL Final     Urea Nitrogen 10/06/2011 18  7 - 30 mg/dL Final     Creatinine 10/06/2011 1.34* 0.66 - 1.25 mg/dL Final     GFR Estimate 10/06/2011 51* >60 mL/min/1.7m2 Final     GFR Estimate If Black 10/06/2011 62  >60 mL/min/1.7m2 Final     Calcium 10/06/2011 8.3* 8.5 - 10.4 mg/dL Final     Color Urine 10/06/2011 Yellow   Final     Appearance Urine 10/06/2011 Clear   Final     Glucose Urine 10/06/2011 Negative  NEG mg/dL Final     Bilirubin Urine 10/06/2011 Negative  NEG Final     Ketones Urine 10/06/2011 Negative  NEG mg/dL Final     Specific Gravity Urine 10/06/2011 1.019  1.003 - 1.035 Final     Blood Urine 10/06/2011 Negative  NEG Final     pH Urine 10/06/2011 5.0  5.0 - 7.0 pH Final     Protein Albumin Urine 10/06/2011 10* NEG mg/dL Final     Urobilinogen mg/dL 10/06/2011 Normal  0.0 - 2.0 mg/dL Final     Nitrite Urine 10/06/2011 Negative  NEG Final     Leukocyte Esterase Urine 10/06/2011 Negative  NEG Final     Source 10/06/2011 Unspecified Urine   Final     WBC Urine 10/06/2011 1  0 - 2 /HPF Final     RBC Urine 10/06/2011 <1  0 - 2 /HPF Final     Squamous Epithelial /HPF Urine 10/06/2011 <1  0 - 1 /HPF Final     Mucous Urine 10/06/2011 Present* NEG /LPF Final     Specimen Description 10/06/2011 Other RFA   Final     Culture Micro 10/06/2011 No growth after 6 days   Final     Micro Report Status 10/06/2011 FINAL [id number]   Final     Occult Blood 10/06/2011 Negative  NEG Final     Lactic Acid 10/06/2011 0.6* 0.7 - 2.1 mmol/L Final     Troponin I ES 10/06/2011 <0.012  0.000 - 0.034 ug/L Final     Platelet Count 10/06/2011 91* 150 - 450 10e9/L Final     XCELERA 10/06/2011    Final                    Value:                           Interpretation Summary                          The visual ejection fraction is estimated at 60-65%. The ascending aorta is                           Mildly dilated. Mild aortic root  "dilatation. There is mild (1+) mitral                           regurgitation. There is mild (1+) tricuspid regurgitation. Right ventricular                           systolic pressure is normal. There is mild to moderate (1-2+) aortic                           regurgitation.                          PatientHeight: 67 in                          PatientWeight: 136 lbs                          SystolicPressure: 120 mmHg                          DiastolicPressure: 58 mmHg                          HeartRate: 60 bpm                          BSA 1.7 m^2                                                                                Left Ventricle                          The left ventricle is normal in size.                          There is normal left ventricular wall thickness.                          Proximal septal thickening is noted.                          Echo findings are not consistent with left ventricular outflow obstruction.                          Left ventricular systolic function is normal.                          The visual ejection fraction is estimated at 60-65%.                          Grade I left ventricular diastolic dysfunction is noted.                          The inferior wall just below the level of the valve appears to demonstrate                           decreased thickening (hypokinesis). Specificity for ischemia is low in this                           cardiac segment and this may represent a normal anatomic \"hinge-point\" of                           insertion of the right ventricle. Otherwise normal wall motion.                                                     Right Ventricle                          The right ventricle is grossly normal size.                          There is prominent trabeculation of the right ventricular apex. There appears                           to be normal systolic function.                                                     Atria                       "    The left atrium is mildly dilated.                          Right atrial size is normal.                                                     Mitral Valve                          The mitral valve leaflets appear normal. There is no evidence of stenosis,                           fluttering, or prolapse.                          There is mild (1+) mitral regurgitation.                                                     Tricuspid Valve                          Normal tricuspid valve.                          There is mild (1+) tricuspid regurgitation.                          The right ventricular systolic pressure is approximated at 19 mmHg plus the                           right atrial pressure.                          Normal IVC (1.5-2.5cm) with >50% respiratory collapse; right atrial pressure                           is estimated at 5-10mmHg.                          Right ventricular systolic pressure is normal.                                                     Aortic Valve                          The aortic valve is trileaflet.                          There is mild trileaflet aortic sclerosis.                          There is mild to moderate (1-2+) aortic regurgitation.                          No hemodynamically significant valvular aortic stenosis.                                                     Pulmonic Valve                          The pulmonic valve is not well seen, but is grossly normal.                          There is moderate (2+) pulmonic valvular regurgitation.                          Right ventricular diastolic pressure is approximated at 7mmHg plus the right                           atrial pressure.                          Normal pulmonic valve velocity.                                                     Vessels                          Mild aortic root dilatation.                          The ascending aorta is Mildly dilated.                          The IVC is normal in  size and reactivity with respiration, suggesting normal                           central venous pressure.                                                     Pericardium                          Trivial pericardial fluid.                                                     Rhythm                          The rhythm was normal sinus.                                                     Procedure                          Complete Portable Echo Adult.                                                     MMode 2D Measurements & Calculations                          IVSd: 1.1 cm                          LVIDd: 4.0 cm                          LVIDs: 2.2 cm                          LVPWd: 0.83 cm                          FS: 45 %                          LV mass(C)d: 123 grams                          Ao root diam: 4.1 cm                          LA dimension: 4.3 cm                          asc Aorta: 3.9 cm                          LA/Ao: 1.1                           Doppler Measurements & Calculations                          MV E point: 63 cm/sec                          MV A point: 93 cm/sec                          MV E/A: 0.68                           MV dec time: 0.26 sec                          AI P1/2t: 405 msec                          TR Max P mmHg                                                     Interpreting Physician:  Madelin Johnson MD electronically signed on                           10- 13:15:35                  Db Rincon MD

## 2020-01-19 NOTE — PLAN OF CARE
OT- Evaluation and treatment initiated. Pt lives with his spouse and Son in a rambler style home. Prior pt independent in all ADLs.   Discharge Planner OT   Patient plan for discharge: Home   Current status: Pt ambulated to the bathroom with SBA/CGA and walker and transferred to/from the toilet with SBA/CGA. Pt completed chair transfer with SBA/CGA. While seated/standing pt completed LE dressing (don pants) with CGA. Per discussion with daughter, pt is at or near baseline for mobility.   Barriers to return to prior living situation: Current level of A for ADLs and functional mobility   Recommendations for discharge: Home with 24/7 A/supervision for all I/ADLs, functional mobility, and stairs.   Rationale for recommendations: Per daughter, pt is at or near baseline for mobility. Will continue to see pt for IP OT to address independence in ADLs. If 24/7 A/supervision is not available, then TCU should be considered.        Entered by: Varghese Guzman 01/19/2020 11:11 AM

## 2020-01-19 NOTE — PLAN OF CARE
Discharge Planner PT   Patient plan for discharge: Unknown  Current status: Order received and chart reviewed. Per discussion with OT, pt is at or near baseline mobility and does not have any acute care PT needs. Mobility needs being met by OT during this hospital stay. Will complete PT orders at this time.  Barriers to return to prior living situation: Defer to OT  Recommendations for discharge: Defer to OT  Rationale for recommendations: Needs being addressed by OT, inpatient PT evaluation not warranted.       Entered by: Stefanie Wetzel 01/19/2020 3:03 PM

## 2020-01-20 NOTE — PLAN OF CARE
Alert, disoriented to situation and place, baseline dementia. VSS, room air. Tele: Sinus gonzalo, HR 21-60's. X1 episode of chest pressure, resolved spontaneously. Denies SOB. Up w/ 1 and walker/GB. EP following patient. Creatinine elevated, IVF started @ 75 ml/hr. SW consulted, plan to discuss comfort cares and hospice may be consulted. Patient's family (son, daughter and wife) would like to be present for hospice consult. Continue to monitor. Atropine PRN available if patient becomes symptomatic, patient has only c/o some dizziness however hard to assess d/t baseline dementia.

## 2020-01-20 NOTE — DISCHARGE INSTRUCTIONS
-The patient will discharge home with [facility] with RN/SW/OT/PT.  [facility] will contact the patient directly to arrange the first visit.  [facility]'s phone number is [phone].

## 2020-01-20 NOTE — PROGRESS NOTES
SW:  Social Work staff is aware of need to meet with daughter to discuss discharge planning.  Due to time limitations, unable to meet with her today.  Will ask Monday SW to address.

## 2020-01-20 NOTE — DISCHARGE SUMMARY
Discharge Summary  Hospitalist    Date of Admission:  1/17/2020  Date of Discharge:  1/20/2020  Discharging Provider: Karin Ortega MD    Primary Care Physician   Deuce Foy  Primary Care Provider Phone Number: [phone]  Primary Care Provider Fax Number: [phone]    PRINCIPAL DIAGNOSIS   Symptomatic sinus pauses, bradycardia with-SA node dysfunction.  Severe dementia from Alzheimer's.  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    Past Medical History:   Diagnosis Date     Chronic renal insufficiency     hx kidney stones     Dementia (H)      Hypertension      Paroxysmal atrial fibrillation (H)      Sinus node dysfunction (H)        History of Present Illness   Nahun Han is an 89 year old male who presented with lightheadedness.    Hospital Course   Nahun Han is a 89 year old male admitted on 1/17/2020 with lightheadedness.     Symptomatic sinus pauses, bradycardia with-SA node dysfunction.  Per chart review daughter had bought him as he was not feeling well in the past several days.  Initial EKG normal sinus rhythm with heart rate of 70. Telemetry monitor with frequent sinus pauses.  PTA not on no AV bj blocking medication.  Electrolytes within normal limits.  TSH normal.  UA negative.   CT of head no acute pathology.  Chest x-ray no acute pathology.  Echocardiogram Mild aortic root dilatation.  Estimated EF 55 to 60%, mild tricuspid regurgitation, mild aortic regurgitation.  Telemetry  paroxysmal AF without RVR, intermittent junctional rhythm in the 40s and occasional sinus asystole less than 4 seconds upon termination of AF.  During hospitalization quite impulsive, intermittently taking off telemetry.  Evaluated by EP, impression sinus node dysfunction from intrinsic conduction disease unlikely to be an incidental finding, unsure if pacemaker placement would do anything to improve quality of life.  Discussed  with patient's family, in agreement with holding off from pacemaker placement as afraid patient might become things and would be high risk for dislodging leads.  Patient's family had met with had met with palliative team, hospice team during hospitalization, did not meet criteria for hospice.   I have signed POLST on 1/19 confirming DNR/DNI status with family.      Severe dementia from Alzheimer's.  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  Used to be in an assisted living facility, his wife did not like it and has subsequently transition to home with his son in October 2019.  During hospitalization initially quite impulsive, needed sitter by bedside.  Was evaluated by PT, OT recommended home with 24 into 7 supervision.  At length discussion with patient's family,  discuss about hospice, comfort care.  Family open to hospice meeting, met with hospice team today and does not meet criteria for hospice.  Family opted for discharge home with home care.  Home RN, home  for monitoring, home safety evaluation requested on discharge.  Follow-up with primary care provider in 1 week, if ongoing issues consider transition to long-term care center versus assisted living facility     Acute kidney injury likely competent of dehydration, diuretic use.  On review of records in care everywhere baseline creatinine around 1, GFR greater than 60.  Presented with creatinine of 1.10, peaked to 1.95.  Received IV hydration, held lisinopril hydrochlorothiazide with which creatinine trended down to 1.21.  Monitor renal function in 1 week, avoid nephrotoxic drugs.     Hypertension.  Holding PTA lisinopril, hydrochlorothiazide given borderline blood pressures, acute kidney injury.  Permissive hypertension in the setting of senile frailty, deconditioning in elderly patient.  Monitor daily blood pressure, heart rate and review on provider visit.    Karin Ortega MD, MD    Pending Results    Unresulted Labs Ordered in the Past 30 Days of this Admission     No orders found from 12/18/2019 to 1/18/2020.             Physical Exam   Vitals:    01/19/20 0500 01/20/20 0459   Weight: 49.9 kg (109 lb 14.4 oz) 50.5 kg (111 lb 6.4 oz)     Vital Signs with Ranges  Temp:  [98.3  F (36.8  C)] 98.3  F (36.8  C)  Pulse:  [55-65] 55  Heart Rate:  [58] 58  Resp:  [16-18] 16  BP: (113-185)/(53-87) 165/78  SpO2:  [96 %-97 %] 97 %  I/O last 3 completed shifts:  In: 955 [P.O.:840; I.V.:115]  Out: 600 [Urine:600]  PHYSICAL EXAM  GENERAL: Patient is in no distress.  Awake, oriented x1.  HEART: Regular rate and rhythm. S1S2.  Bradycardia.  No murmurs.  LUNGS: Bilateral clear breath sounds, no wheezing no crackles.  NEURO: Moving all extremities, unable to follow commands.  EXTREMITIES: No pedal edema. 1 + peripheral pulses.  SKIN: Warm, dry.   PSYCHIATRY Cooperative    )Consultations This Hospital Stay   ELECTROPHYSIOLOGY IP CONSULT  PALLIATIVE CARE ADULT IP CONSULT  SOCIAL WORK IP CONSULT  PHYSICAL THERAPY ADULT IP CONSULT  OCCUPATIONAL THERAPY ADULT IP CONSULT  SOCIAL WORK IP CONSULT    Time Spent on this Encounter   Karin SPENCER MD, personally saw the patient today and spent greater than 30 minutes discharging this patient.  Discussed with patient, his wife, son, daughter, bedside RN, , care coordinator.    Discharge Orders      Home Care Social Service Referral for Hospital Discharge      Home care nursing referral      Home Care PT Referral for Hospital Discharge      Home Care OT Referral for Hospital Discharge      Reason for your hospital stay    Admitted on 1/17/2020 with lightheadedness. Noted to have Symptomatic sinus pauses, bradycardia, evaluated by EP cardiology, recommend not a candidate for pacemaker placement.  Family had met with palliative, hospice team, not a candidate for hospice at this time and plan for discharge home with home care.     Follow-up and recommended labs and tests      Follow up with primary care provider, Deuce Foy, within 7 days for hospital follow- up.  The following labs/tests are recommended: BMP.     Activity    Your activity upon discharge: activity as tolerated and no driving.  Fall precautions.     Monitor and record    Monitor daily blood pressure, heart rate readings and review on provider visit, consider therapy if needed.     When to contact your care team    Seek medical attention if any chest pain, shortness of breath, palpitations, lightheadedness.     Discharge Instructions    Avoid over-the-counter medication.     MD face to face encounter    Documentation of Face to Face and Certification for Home Health Services    I certify that patient: Nahun Han is under my care and that I, or a nurse practitioner or physician's assistant working with me, had a face-to-face encounter that meets the physician face-to-face encounter requirements with this patient on: 1/20/2020.    This encounter with the patient was in whole, or in part, for the following medical condition, which is the primary reason for home health care: Symptomatic bradycardia with sinus pauses, physical deconditioning from senile fragility, advanced dementia..    I certify that, based on my findings, the following services are medically necessary home health services: Nursing, Occupational Therapy, Physical Therapy and Social Work.    My clinical findings support the need for the above services because: Nurse is needed: To assess vitals after changes in medications or other medical regimen..,and  for home safety eval.  Home PT for physical deconditioning, home OT for advanced dementia.    Further, I certify that my clinical findings support that this patient is homebound (i.e. absences from home require considerable and taxing effort and are for medical reasons or Church services or infrequently or of short duration when for other reasons) because: Patient with advanced dementia,  physical deconditioning, sinus pauses with intermittent lightheadedness.  Not a candidate for pacemaker placement per EP due to significant cognitive impairment..    Based on the above findings. I certify that this patient is confined to the home and needs intermittent skilled nursing care, physical therapy and/or speech therapy.  The patient is under my care, and I have initiated the establishment of the plan of care.  This patient will be followed by a physician who will periodically review the plan of care.  Physician/Provider to provide follow up care: Deuce Foy    Attending hospital physician (the Medicare certified [facility] provider): Karin Ortega MD  Physician Signature: See electronic signature associated with these discharge orders.  Date: 1/20/2020     Diet    Follow this diet upon discharge: Orders Placed This Encounter      Regular Diet Adult  Adequate oral hydration.       Discharge Medications   Current Discharge Medication List      CONTINUE these medications which have NOT CHANGED    Details   acetaminophen (TYLENOL) 500 MG tablet Take 500-1,000 mg by mouth every 4 hours as needed      aspirin 81 MG tablet Take by mouth daily      cholecalciferol (VITAMIN D3) 125 MCG (5000 UT) TABS tablet Take 5,000 Units by mouth daily      fluticasone (FLONASE) 50 MCG/ACT nasal spray Spray 1-2 sprays in nostril daily as needed          STOP taking these medications       lisinopril-hydrochlorothiazide (PRINZIDE,ZESTORETIC) 10-12.5 MG per tablet Comments:   Reason for Stopping:             Allergies   No Known Allergies    Discharge Disposition   Discharged to home  Condition at discharge: Fair    DATA  Most Recent 3 CBC's:  Recent Labs   Lab Test 01/19/20  1156 01/17/20  1848   WBC  --  5.2   HGB 12.0* 13.2*   MCV  --  95   PLT  --  180      Most Recent 3 BMP's:  Recent Labs   Lab Test 01/20/20  0541 01/19/20  1748 01/18/20  2335    140 140   POTASSIUM 4.2 4.4 4.7   CHLORIDE 111* 109 109   CO2 26 28 26    BUN 35* 39* 42*   CR 1.21 1.38* 1.95*   ANIONGAP 3 3 5   ASHISH 8.0* 8.1* 8.3*   GLC 81 137* 122*     Most Recent 2 LFT's:  Recent Labs   Lab Test 01/20/20  0541 01/17/20  1848   AST 25 12   ALT 20 19   ALKPHOS 81 95   BILITOTAL 0.7 0.4     Most Recent 3 Troponin's:  Recent Labs   Lab Test 01/17/20 1848   TROPI <0.015     Most Recent TSH, T4 and A1c Labs:  Recent Labs   Lab Test 01/18/20  0544   TSH 2.37     Results for orders placed or performed during the hospital encounter of 01/17/20   CT Head w/o Contrast    Narrative    CT SCAN OF THE HEAD WITHOUT CONTRAST   1/17/2020 7:07 PM     HISTORY:  Altered mental status, unexplained.    TECHNIQUE:  Axial images of the head and coronal reformations without  IV contrast material. Radiation dose for this scan was reduced using  automated exposure control, adjustment of the mA and/or kV according  to patient size, or iterative reconstruction technique.    COMPARISON: CT head dated 10/6/2011.    FINDINGS:  Moderate global brain parenchymal volume loss, as before.  Unchanged focal hypodensity within the right centrum semiovale,  similar to prior study and representing either a chronic infarct or  focal small vessel ischemic change. Minimal hypoattenuation in the  periventricular/deep cerebral white matter elsewhere likely represents  chronic small vessel ischemic disease. Prominence of the extra-axial  spaces in the right more than left posterior fossa, unchanged. The  ventricles are normal in size and configuration. No acute intracranial  hemorrhage, mass lesion, mass effect or shift/herniation. Scattered  intracranial vascular calcifications primarily involving the carotid  siphons and left vertebral artery.    Small retention cysts in the left sphenoid and left maxillary sinuses  are noted. Paranasal sinuses are otherwise free of significant  disease. Orbits appear within normal limits. Mastoid and middle ear  cavities are clear. Advanced degenerative changes of the  right  temporomandibular joint.      Impression    IMPRESSION:  1. No acute intracranial abnormality.  2. Stable chronic findings, as detailed.    BRO DE LA GARZA MD   XR Chest 2 Views    Narrative    CHEST TWO VIEWS   2020 11:56 AM     HISTORY: Lightheadedness, sinus pauses, fatigue - question lung mass.    COMPARISON: Chest x-ray on 10/6/2011      Impression    IMPRESSION: AP and lateral views of the chest were obtained.  Cardiomediastinal silhouette is within normal limits. Atherosclerotic  vascular calcification of the aortic knob. No suspicious focal  pulmonary opacities. No significant pleural effusion or pneumothorax.  Diffuse osteopenia and degenerative changes of the spine.    JOSE SOARES MD   Echocardiogram Complete    Narrative    [id number]  [id number]  [id number]  [id number]^TINY^SIMONE           [facility]  Echocardiography [facility]  [address] [place] [address]        Name: GINNY GONZALEZ  MRN: [id number]  : 1928  Study Date: 2020 10:41 AM  Age: 89 yrs  Gender: Male  Patient Location: [facility]  Reason For Study: Dizziness  Ordering Physician: SIMONE FRANKS  Referring Physician: Tony Paul  Performed By: Colleen Alicea     BSA: 1.6 m2  Height: 65 in  Weight: 119 lb  HR: 46  BP: 103/68 mmHg  _____________________________________________________________________________  __        Procedure  Complete Portable Echo Adult.  _____________________________________________________________________________  __        Interpretation Summary     Mild aortic root dilatation.  The ascending aorta is Mildly dilated.  The visual ejection fraction is estimated at 55-60%.  The left atrium is mildly dilated.  There is mild (1+) tricuspid regurgitation.  Right ventricular systolic pressure is normal.  There is mild (1+) aortic regurgitation.  _____________________________________________________________________________  __        Left Ventricle  The left  ventricle is normal in size. There is normal left ventricular wall  thickness. The visual ejection fraction is estimated at 55-60%. Diastolic  function not assessed due to atrial fibrillation.     Right Ventricle  The right ventricle is normal in structure, function and size.     Atria  The left atrium is mildly dilated. Right atrial size is normal.     Mitral Valve  The mitral valve leaflets appear thickened, but open well. There is trace  mitral regurgitation.        Tricuspid Valve  Tricuspid leaflets are thickened. There is mild (1+) tricuspid regurgitation.  Right ventricular systolic pressure is normal.     Aortic Valve  There is moderate trileaflet aortic sclerosis. There is mild (1+) aortic  regurgitation.     Pulmonic Valve  There is trace pulmonic valvular regurgitation.     Vessels  Mild aortic root dilatation. The ascending aorta is Mildly dilated. The  inferior vena cava is normal.     Pericardium  There is no pericardial effusion.        Rhythm  The rhythm was atrial fibrillation.  _____________________________________________________________________________  __  MMode/2D Measurements & Calculations  IVSd: 1.0 cm     LVIDd: 3.2 cm  LVIDs: 1.9 cm  LVPWd: 1.1 cm  FS: 40.3 %  LV mass(C)d: 98.6 grams  LV mass(C)dI: 62.1 grams/m2  Ao root diam: 4.2 cm  LA dimension: 4.2 cm  asc Aorta Diam: 3.9 cm  LA/Ao: 0.98  LVOT diam: 2.4 cm  LVOT area: 4.7 cm2  RWT: 0.68        Doppler Measurements & Calculations  MV E max alexandra: 87.8 cm/sec  MV A max alexandra: 24.7 cm/sec  MV E/A: 3.5  MV dec slope: 448.3 cm/sec2  AI P1/2t: 835.1 msec  LV V1 max PG: 3.8 mmHg  LV V1 max: 97.0 cm/sec  LV V1 VTI: 19.7 cm  SV(LVOT): 91.8 ml  SI(LVOT): 57.9 ml/m2  PA acc time: 0.11 sec  PI end-d alexandra: 82.5 cm/sec     TR max alexandra: 251.1 cm/sec  TR max P.8 mmHg  E/E' av.6  Lateral E/e': 13.9  Medial E/e': 11.2           _____________________________________________________________________________  __           Report approved by: Nathen Whitley  Hung 01/18/2020 11:53 AM

## 2020-01-20 NOTE — PLAN OF CARE
VSS. SR/SB 50-60's. Patient refusing telemetry, attempting to remove iv, getting out of bed without assistance. Sitter at bedside but unable to redirect. Seroquel given. Patient able to sleep from midnight until 0500.

## 2020-01-20 NOTE — PLAN OF CARE
Discharge Planner OT   Patient plan for discharge: Home today with 24 hour care from family and Home PT/OT  Current status: Pt and family were educated on ADL equipment for home, educated on ways to obtain DME and how to use it. Dispensed handouts about tub bench/tub transfer and equipment for the toilet. Pt completed bed mobility (I)ly and then close SBA to ambulate to/from bathroom. Stood to try to urinate but unable to void more than a few drops. Notified RN.   Barriers to return to prior living situation: none, pt has 24 hour care  Recommendations for discharge: Home with 24 hour care from family and home PT/OT  Rationale for recommendations: Pt requires 24 hour care due to dementia; home PT/OT warranted as patient is not able to leave the home without assist of another person and AD and would benefit from further therapies to maximize (I), home safety and activity tolerance.       Entered by: Korina Rojas 01/20/2020 2:10 PM     Occupational Therapy Discharge Summary    Reason for therapy discharge:    Discharged to home with home therapy.    Progress towards therapy goal(s). See goals on Care Plan in [facility] record for goal details.  Goals partially met.  Barriers to achieving goals:   limited tolerance for therapy and discharge from facility.    Therapy recommendation(s):    Continued therapy is recommended.  Rationale/Recommendations:  Home PT/OT as above.

## 2020-01-20 NOTE — PROGRESS NOTES
Cardiology Progress Note          Assessment and Plan:   89 year-old white male with severe dementia. He called his wife to his bed for not feeling well on 1/17/2020. The wife checked his BP which was in the 80s. He was brought to ER because of that.  He said that he did not feel well on 1/17/2020 because he did not sleep well. He has not had complaints for the last 2 days after the admission, including during the time of bradycardia.  He has been found to have paroxysmal AF without RVR, intermittent junctional rhythm in the 40s and occasional sinus asystole <4 seconds upon termination of AF.  No history of syncope or recent falls.  He is not cooperative and has tried to take off telemetry. The family is concerned that he may scratch and take a pacemaker out if implanted.  DNR/DNI.  I would not recommend a leadless pacemaker at this point. It is reasonable to evaluate for possible hospice care.  Sign off   Pedro Gill MD  Cardiology   [phone]                Physical Exam:   Blood pressure (!) 165/78, pulse 55, temperature 98.3  F (36.8  C), temperature source Axillary, resp. rate 16, weight 50.5 kg (111 lb 6.4 oz), SpO2 97 %.  Wt Readings from Last 4 Encounters:   01/20/20 50.5 kg (111 lb 6.4 oz)   07/28/14 54 kg (119 lb)   07/14/14 54.4 kg (120 lb)   10/06/11 62 kg (136 lb 11 oz)      I/O last 3 completed shifts:  In: 955 [P.O.:840; I.V.:115]  Out: 600 [Urine:600]    Constitutional: Alert, no apparent distress,    Lungs: No crackles or wheezing,    Cardiovascular: Regular rate and rhythm, normal S1 and S2, and no murmur,    Lymphm node  Neck  ENT  Neurologic  Abdomen: No enlargement  No jugular vein extension or carotid bruit  No apparent abnormality  No focal deficit  Normal bowel sounds, soft, no distension, no tender   Skin: No rashes, no cyanosis   Extremity: No edema          Medications:     Current Facility-Administered Medications:      acetaminophen (TYLENOL) tablet 650 mg, 650 mg, Oral, Q4H PRN,  Jimena Zamorano MD     atropine injection 0.5 mg, 0.5 mg, Intravenous, Q5 Min PRN, Hubert Queen APRN CNP     lidocaine (LMX4) cream, , Topical, Q1H PRN, Jimena Zamorano MD     lidocaine 1 % 0.1-1 mL, 0.1-1 mL, Other, Q1H PRN, Jimena Zamorano MD     melatonin tablet 1 mg, 1 mg, Oral, At Bedtime PRN, Jimena Zamorano MD, 1 mg at 01/19/20 2201     naloxone (NARCAN) injection 0.1-0.4 mg, 0.1-0.4 mg, Intravenous, Q2 Min PRN, Jimena Zamorano MD     ondansetron (ZOFRAN-ODT) ODT tab 4 mg, 4 mg, Oral, Q6H PRN **OR** ondansetron (ZOFRAN) injection 4 mg, 4 mg, Intravenous, Q6H PRN, Jimena Zamorano MD     potassium chloride (KLOR-CON) Packet 20-40 mEq, 20-40 mEq, Oral or Feeding Tube, Q2H PRN, Jimena Zamorano MD     potassium chloride 10 mEq in 100 mL intermittent infusion with 10 mg lidocaine, 10 mEq, Intravenous, Q1H PRN, Jimena Zamorano MD     potassium chloride 10 mEq in 100 mL sterile water intermittent infusion (premix), 10 mEq, Intravenous, Q1H PRN, Jimena Zamorano MD     potassium chloride 20 mEq in 50 mL intermittent infusion, 20 mEq, Intravenous, Q1H PRN, Jimena Zamorano MD     potassium chloride ER (K-DUR/KLOR-CON M) CR tablet 20-40 mEq, 20-40 mEq, Oral, Q2H PRN, Jimena Zamorano MD     senna-docusate (SENOKOT-S/PERICOLACE) 8.6-50 MG per tablet 1 tablet, 1 tablet, Oral, BID, 1 tablet at 01/20/20 0858 **OR** senna-docusate (SENOKOT-S/PERICOLACE) 8.6-50 MG per tablet 2 tablet, 2 tablet, Oral, BID, Jimena Zamorano MD     sodium chloride (PF) 0.9% PF flush 3 mL, 3 mL, Intracatheter, q1 min prn, Jimena Zamorano MD     sodium chloride (PF) 0.9% PF flush 3 mL, 3 mL, Intracatheter, Q8H, Jimena Zamorano MD, 3 mL at 01/19/20 1712     sodium chloride 0.9% infusion, , Intravenous, Continuous, Karin Ortega MD, Stopped at 01/20/20 0707           Lab results:        Recent Labs   Lab Test 01/20/20  0541 01/19/20  1748 01/18/20  2335     140 140   POTASSIUM 4.2 4.4 4.7   CHLORIDE 111* 109 109   ASHISH 8.0* 8.1* 8.3*   CO2 26 28 26   BUN 35* 39* 42*   CR 1.21 1.38* 1.95*   GLC 81 137* 122*     Recent Labs   Lab Test 01/19/20  1156 01/17/20  1848   HGB 12.0* 13.2*   WBC  --  5.2   PLT  --  180     No lab results found.  Recent Labs   Lab Test 01/17/20 1848   TROPI <0.015

## 2020-01-20 NOTE — PROGRESS NOTES
"SPIRITUAL HEALTH SERVICES Progress Note  [facility]    Initiated visit due to palliative consult.  Pt was alone in room.  Pt was in a pleasant mood and stated that he was \"feeling good,\" stating that he was grateful his sister was able to be here to visit with him.  Pt stated he will possibly be discharged today.  Pt is Orthodox, and stated he would appreciate  visit.  Pt states he is \"comfortable\" with his spirituality, and was receptive to prayer.  SH provided emotional support and offered prayer.  SH will f/u as needed.    Tone Triplett  Chaplain Resident    "

## 2020-01-20 NOTE — CONSULTS
Care Transition Initial Assessment -      Met with: Patient's daughter Radha  Active Problems:    Sinus pause    Dementia (H)    Hypertension    Paroxysmal atrial fibrillation (H)    Sinus node dysfunction (H)       DATA  Lives With: spouse   Living Arrangements: house(rambler style home )  Quality of Family Relationships: involved, supportive  Description of Support System: Supportive, Involved  Who is your support system?: Children, Wife  Support Assessment: Adequate family and caregiver support.   Identified issues/concerns regarding health management:       Quality of Family Relationships: involved, supportive     Per social work consult for discharge planning.  Patient was admitted on 1-17-20 with lightheadedness.  The tentative date of discharge is yet to be determined.  Reviewed chart and spoke with patient's daughter Radha regarding discharge plans.  Per patient's daughter's report, patient lives with his wife and son in a house.  Once inside patient can remain on the main level.  Patient's wife has some medical issues herself so she is limited in her caregiver abilities herself.  Patient and family have spoken with palliative care and they are interested in meeting with hospice.  Offered patient's daughter a choice of hospice agencies and patient's daughter states she has no preference.  Referral made to Estephania Somers from [facility].   Estephania Somers states she can meet with patient and family at 10:00 today.  Call placed to update patient's daughter and this time works for her and the family.  Discharge planning to follow.    ASSESSMENT  Cognitive Status:  Did not meet patient, spoke with patient's daughter on the phone  Concerns to be addressed: discharge planning, hospice care on discharge     PLAN  Financial costs for the patient includes N/A  Patient given options and choices for discharge hospice agencies.  Patient/family is agreeable to the plan?  Yes  Transportation/person available to transport on  day of discharge  is TBD and have they been notified/set up TBD  Patient Goals and Preferences: TBD.  Patient anticipates discharging to:  TBD.    Will continue to follow and assist with a safe discharge plan.      REN Aj, Dorothea Dix Psychiatric CenterSW  Lead   [phone]  [facility]

## 2020-01-20 NOTE — PROGRESS NOTES
Care Coordination:    -Home care orders faxed to [facility] at [phone].    Mahogany Castillo RN, BAN  Inpatient Care Coordination  [address]  [place] [address]  [email or web address]   Office: [phone]  Fax: [phone]  Gender pronouns: she/her/hers  Employed by [facility]

## 2020-01-20 NOTE — PLAN OF CARE
Tele: SB. BP elevated. IV pulled out this AM. Bladder scan for 360 ml, able to void 125 cc. Discharge instructions reviewed with patient's wife and son. All questions and concerns addressed. Pt brought down to door 6 in WC where his son picked him up. Pt's wife states that they have all of the patients belongings.

## 2020-01-20 NOTE — CONSULTS
Care Coordination:    -Met with pt, spouse, daughter and son.  Pt does not qualify for hospice at this time. They would like homecare upon discharge.  Pt/family was given the Medicare Compare list for Home Care, with associated star ratings to assist with choice for referrals/discharge planning; Yes. Education was given to pt/family that star ratings are updated/maintained by Medicare and can be reviewed by visiting www.medicare.gov; Yes.  Offered patient a choice of home care agencies. Pt would like to use [facility] care. Pt understands they must be homebound. Pt informed of the plan and in agreement with the plan. E-mail referral sent to [facility] updating them on the orders.  Home Care phone number placed on discharge instructions.     Daughter Radha had questions regarding assisted living.  Pt and spouse had been at [facility] from August 2019-to Mid December 2019.  Gave them a copy of the Senior Housing Guide.    Wife Shereen would like to make follow-up appointments on her own. They use [facility] for transport to appointments. Daughter Radha helps them run errands.     Son states he will work on the [facility] paperwork to see if the pt qualifies for any services.          Mahogany Castillo RN, BAN  Inpatient Care Coordination  [address] [address]  [email or web address]  [email or web address]   Office: [phone]  Fax: [phone]  Gender pronouns: she/her/hers  Employed by [employer] [facility]

## 2020-01-20 NOTE — CONSULTS
Writer met with patient's spouse, Shereen, son Silvano and daughter/BRITT Garcia at [facility] 2nd floor conference to discuss Hospice philosophy, services and benefits under Medicare. Patient is a 89 y.o. with HTN, history of prostate cancer and Alzheimer's dementia, admitted to the hospital on 1/17/20 with lightheadedness. Patient was found to have sinus pauses with junctional escape rhythm. Family has declined pacemaker placement. Writer spoke to Dr. Grayson Kirk,  Hospice Medical Director regarding hospice eligibility, at this time, patient does not meet hospice criteria with a cardiac or Alzheimer's dementia diagnosis. Cardiology states that his sinus pauses are likely incidental findings and that his intermittent junctional rhythm findings can be quite stable for months. Patient is alert and orient x 1, able to answer simple questions. Patient ambulates with walker. Per daughter, patient is independent with bathing, dressing, toileting, and feeding, has a good appetite. Patient is continent of bladder and bowels, per spouse. Patient and spouse live independently in their own home, son lives in the basement. Family would like patient to discharge home and are interested in  Home Care Services, PT and OT. DWIGHT Vides will put in a referral for Home Care services. There are no discharge plans in place at this time. Family was given a  Hospice Bridge sheet and instructed to call our 24 hour number with any questions.  Coordinated cares with DWIGHT Vides.  Please call  Hospice at [phone] with any questions.    Thank you for this referral,  Estephania Flores RN

## 2020-01-20 NOTE — PLAN OF CARE
[id number]: A&O to self only. VSS. RA. Denies pain. Up in chair most of evening. SW consult tomorrow. Will continue to monitor.

## 2020-01-21 NOTE — TELEPHONE ENCOUNTER
FNA triage call :   Presenting problem : Wife called without Pt .   Pt was at [facility],  admitted on 1/17/20 and  Discharge 1/20/20 Dx with Bradycardia , alzheimer's , acute kidney injury due to dehydration ,  & Hypertension .  Wife reports at 10 am today = BP 92/54 p 58 , and last 230pm = Bp  151/81 pulse 57 . [facility] considered Pacemaker but thought is would not improve  The quality of life .  Wife and FNA reviewed discharge instruction and then Pt was eventually asked if he is having any symptoms now but Pt  denies any symptoms at 3 pm  . Wife will follow up hospital appt  With his [facility]  PCP on 1/24/20.     Disposition and recommendations : FNA advised  Move up appt with PCP eariler if possible and call back if any further problems or symptoms to triage. .   Caller verbalizes understanding and denies further questions and will call back if  symptom triage requested  or questions  . Kenisha Snow RN  - [place] Nurse Advisor

## 2020-01-30 NOTE — ED PROVIDER NOTES
History     Chief Complaint:  Chest Pain and Hypertension    The history is provided by the spouse and the EMS personnel. History limited by: Dementia.      Nahun Han is a 89 year old male who presents via EMS for chest pain and hypertension. The patients wife called the nurse line because she took his blood pressure and it was 85/49 with a pulse of 41. He had an episode of sharp chest pain this morning. He was feeling light headed at that time and laid down for a while. He also had a fall in the bathroom this morning at around 0930 but denies hitting his head. EMS reports that his blood pressure was 200s systolic in route. He denies any fever, shortness of breath, headache, nausea, vomiting, and diarrhea.    Allergies:  No known drug allergies.    Medications:    Aspirin  Ambien    Past Medical History:    Chronic renal insufficiency  Dementia  Hypertension  Paroxysmal atrial fibrillation  Sinus node dysfunction  Sinus pause  Syncope  Hypokalemia   Prostate cancer  Vertigo    Past Surgical History:    Kidney stone procedures  Herniorrhaphy inguinal  Prostate surgery    Family History:    History reviewed. No pertinent family history.    Social History:  Patient is   Tobacco Use: Former smoker  Alcohol Use: Yes  PCP: Deuce Foy     Review of Systems   Unable to perform ROS: Dementia     Physical Exam   First Vitals:  Patient Vitals for the past 24 hrs:   BP Temp Temp src Pulse Heart Rate Resp SpO2   01/30/20 1430 (!) 153/69 -- -- 51 57 18 --   01/30/20 1420 (!) 152/67 -- -- -- 51 21 96 %   01/30/20 1400 (!) 149/61 -- -- (!) 49 (!) 48 20 97 %   01/30/20 1350 (!) 146/87 -- -- -- (!) 43 12 96 %   01/30/20 1335 -- -- -- -- (!) 47 26 96 %   01/30/20 1330 137/60 -- -- (!) 48 50 9 93 %   01/30/20 1325 -- -- -- -- (!) 47 19 96 %   01/30/20 1320 -- -- -- -- (!) 34 10 95 %   01/30/20 1315 139/81 -- -- 51 (!) 49 13 98 %   01/30/20 1310 (!) 158/64 -- -- 50 (!) 48 10 97 %   01/30/20 1250 -- -- -- -- (!) 18 94  96 %   01/30/20 1245 (!) 145/59 -- -- (!) 48 (!) 46 -- --   01/30/20 1240 -- -- -- -- (!) 48 11 96 %   01/30/20 1235 -- -- -- -- 50 25 96 %   01/30/20 1230 (!) 165/76 -- -- (!) 46 -- -- --   01/30/20 1215 (!) 213/175 -- -- (!) 30 (!) 35 (!) 37 94 %   01/30/20 1205 (!) 154/53 -- -- (!) 33 50 18 97 %   01/30/20 1155 (!) 191/69 97.6  F (36.4  C) Oral 52 -- 20 97 %     Physical Exam  Nursing note and vitals reviewed.  Constitutional:  Awake and alert, but demented. Cooperative.   HENT:   Nose:    Nose normal.   Mouth/Throat:   Mucous membranes are normal.   Eyes:    Conjunctivae normal and EOM are normal.      Pupils are equal, round, and reactive to light.   Neck:    Trachea normal.   Cardiovascular:  Bradycardic rate, regular rhythm, normal heart sounds and normal pulses. No murmur heard.  Pulmonary/Chest:  Effort normal and breath sounds normal.   Abdominal:   Soft. Normal appearance and bowel sounds are normal.      There is no tenderness.      There is no rebound and no CVA tenderness.   Musculoskeletal:  Extremities atraumatic x 4.   Lymphadenopathy:  No cervical adenopathy.   Neurological:   Awake and alert, but demented. Normal strength.      No cranial nerve deficit or sensory deficit. GCS eye subscore is 4.      GCS verbal subscore is 5. GCS motor subscore is 6.   Skin:    Skin is intact. No rash noted.   Psychiatric:   Normal mood and affect.    Emergency Department Course   ECG:  @ 1155  Indication: Chest pain  Vent. Rate 52 bpm. ND interval 182 ms. QRS duration 96 ms. QT/QTc 446/414 ms. P-R-T axis 58 -37 53.   Sinus bradycardia. Left axis deviation. Abnormal ECG.  No significant change when compared to previous ECG from 1/18/2020   Read @ 1158 by Dr. Parson.    @ 1239  Indication: Recheck  Vent. Rate 44 bpm. ND interval 190 ms. QRS duration 98 ms. QT/QTc 470/401 ms. P-R-T axis 69 -33 36.   Marked sinus bradycardia with marked sinus arrhythmia. Left axis deviation. Abnormal ECG.   Read @ 9308 by   Blank.    Imaging:  Radiographic findings were communicated with the family who voiced understanding of the findings.  CT head w/o contrast:  No acute intracranial abnormality. Per radiology read.   XR chest 2 views:  PA and lateral views of the chest. Lungs are clear. Heart  is normal in size. No effusions are evident. No pneumothorax. Per radiology read.    Laboratory:  CBC:  WBC 5.4, HGB 12.0 low,   CMP: Anion Gap 2 low, Albumin 3.1 low, Protein Total 6.2 low, o/w WNL. (Creatinine 1.05)  Troponin: <0.015  BNP: 2,000 high  UA: WNL    Emergency Department Course:  11:57 AM Nursing notes and vitals reviewed.  I performed an exam of the patient as documented above.     1:47 PM I consulted with Dr. Rincon of electrophysiology regarding the patient.    2:22 PM I rechecked on and updated the patient.    3:24 PM Findings and plan explained to the patient. Patient discharged home with instructions regarding supportive care, medications, and reasons to return. The importance of close follow-up was reviewed.     Impression & Plan      Medical Decision Making:  Nahun Han is a 89 year old male brought in by EMS for an episode of low blood pressure at home as well as a low pulse rate. He also may have had some sharp chest pain this morning as well, although he is not able to provide much in the way of history, and his wife also is not the best historian. His blood pressures for EMS and here were all quite high though rather than low. His heart rate initially was in the mid 50s but it did drop down to the 30s and 40s. I proceeded with the above workup here including blood work and EKG as well as a chest x-ray and a CT scan of his head, as he apparently fell this morning in the bathroom. His workup here is for the most part unremarkable except for the heart rates in the 30s and 40s. I then spoke with the electrophysiologist that saw him in the hospital, Dr. Rincon, who remembered the patient quite well. He  indicated that he did not feel the patient was a good candidate for a pacemaker but if the patient's family insisted they would likely go forward with that. I then went and spoke with the family again and including the patient's daughter. They prefer not to proceed with the pacemaker as well, and they feel comfortable going back home. They are in the process also of trying to find a long term care facility and also have help at home at this point. I recommended close outpatient follow up though and certainly returning with any concerns or worsening symptoms.     Diagnosis:    ICD-10-CM    1. Bradycardia R00.1    2. Sick sinus syndrome (H) I49.5        Disposition:  discharged to home    Discharge Medications:  New Prescriptions    No medications on file     I, Bradley Aasen, am serving as a scribe on 1/30/2020 at 11:56 AM to personally document services performed by Moris Parson MD based on my observations and the provider's statements to me.          Moris Parson MD  01/30/20 [id number]

## 2020-01-30 NOTE — ED AVS SNAPSHOT
Emergency Department  [address] [place] [address]  Phone:  [phone]  Fax:  [phone]                                    Nahun Han   MRN: [id number]    Department:   Emergency Department   Date of Visit:  1/30/2020           After Visit Summary Signature Page    I have received my discharge instructions, and my questions have been answered. I have discussed any challenges I see with this plan with the nurse or doctor.    ..........................................................................................................................................  Patient/Patient Representative Signature      ..........................................................................................................................................  Patient Representative Print Name and Relationship to Patient    ..................................................               ................................................  Date                                   Time    ..........................................................................................................................................  Reviewed by Signature/Title    ...................................................              ..............................................  Date                                               Time          22EPIC Rev 08/18

## 2020-01-30 NOTE — ED NOTES
Pt extremely bradycardic. Pt's HR in the 30s. Pt alert and oriented appropriately when HR is in the 30s. Pt hypertensive 200s/100s. Provider aware.

## 2020-01-30 NOTE — ED TRIAGE NOTES
Pt presents to the ED for evaluation of chest pain, blood pressure issues, and fall. Per EMS, pt was at home today and experienced sharp chest pain. Per EMS, pt's BP was in the 200s systolic on en route to ED. Per Pt's wife, she called EMS due to hypotension. Per wife, pt's BP was 80s/40s at home. Per wife, pt also fell at 0930 unwitnessed in the bathroom. Pt denies taking blood thinners and pt does not recall the fall.

## 2020-02-01 PROBLEM — R06.02 SOB (SHORTNESS OF BREATH) ON EXERTION: Status: ACTIVE | Noted: 2020-01-01

## 2020-02-01 PROBLEM — I24.9 ACS (ACUTE CORONARY SYNDROME) (H): Status: ACTIVE | Noted: 2020-01-01

## 2020-02-01 PROBLEM — R00.1 SYMPTOMATIC BRADYCARDIA: Status: ACTIVE | Noted: 2020-01-01

## 2020-02-01 PROBLEM — I21.4 NSTEMI (NON-ST ELEVATED MYOCARDIAL INFARCTION) (H): Status: ACTIVE | Noted: 2020-01-01

## 2020-02-01 NOTE — PLAN OF CARE
Pt denies chest pain, troponin trending down, pt quite FLORES, stable vitals, occasionally pt gonzalo's down to 40's. Heparin drip at 700 units/hr.

## 2020-02-01 NOTE — ED PROVIDER NOTES
History     Chief Complaint:  Shortness of Breath    The history is provided by the patient, the spouse and a relative.      Nahun Han is a 89 year old male, with history of prostate cancer, dementia, chronic renal insufficiency, hypertension, atrial fibrillation amongst others as noted below, not currently anticoagulated, who presents with his wife and son for evaluation of some shortness of breath with associated chest pain/tightness, rating it a 6/10, that began at 0200 this morning. Patient reports he has been in bed most of the day due to increased fatigue. Wife notes that he has been having issues with bradycardia and erratic blood pressure readings. Patient woke her up at 0200 and endorsed a bad headache and some chest discomfort/tightness. Wife gave patient some Tylenol for the pain. Son notes that patient had similar chest discomfort a week ago. Due to symptoms, was prompted to present.     Of note, patient was evaluated in the ED on 1/30 for complaints of chest pain and abnormal blood pressures, with the below work up. Patient denies any upper extremity pain, diaphoresis, leg swelling, fever, significant cough, abdominal pain or vomiting.     Work Up from ED Visit 1/30/20:  CT head w/o contrast:  No acute intracranial abnormality. Per radiology read.   XR chest 2 views:  PA and lateral views of the chest. Lungs are clear. Heart  is normal in size. No effusions are evident. No pneumothorax. Per radiology read.     CBC:  WBC 5.4, HGB 12.0 low,   CMP: Anion Gap 2 low, Albumin 3.1 low, Protein Total 6.2 low, o/w WNL. (Creatinine 1.05)  Troponin: <0.015  BNP: 2,000 high  UA: WNL    ECG @ 1155 on 1/30/20  Indication: Chest pain  Vent. Rate 52 bpm. TX interval 182 ms. QRS duration 96 ms. QT/QTc 446/414 ms. P-R-T axis 58 -37 53.   Sinus bradycardia. Left axis deviation. Abnormal ECG.  No significant change when compared to previous ECG from 1/18/2020   Read @ 1158 by Dr. Parson.     ECG @ 0058  on 1/30/20  Indication: Recheck  Vent. Rate 44 bpm. WI interval 190 ms. QRS duration 98 ms. QT/QTc 470/401 ms. P-R-T axis 69 -33 36.   Marked sinus bradycardia with marked sinus arrhythmia. Left axis deviation. Abnormal ECG.   Read @ 1243 by Dr. Parson.    Allergies:  No Known Drug Allergies     Medications:    Aspirin 81 mg   Ambien    Past Medical History:    Chronic renal insufficiency  Dementia  Hypertension  Paroxymal atrial fibrillation  Sinus node dysfunction  Prostate cancer  Vertigo    Past Surgical History:    Genitourinary surgery - kidney stones  Herniorrhaphy inguinal  Prostate surgery    Family History:    Father - prostate cancer  Mother - Parkinson    Social History:  The patient was accompanied to the ED by wife and son.  Smoking Status: Former  Alcohol Use: Yes  Drug Use: No   Marital Status:   [2]     Review of Systems   Constitutional: Positive for fatigue. Negative for diaphoresis and fever.   Respiratory: Positive for chest tightness and shortness of breath. Negative for cough.    Cardiovascular: Positive for chest pain. Negative for leg swelling.   Gastrointestinal: Negative for abdominal pain and vomiting.   Musculoskeletal: Negative for myalgias.   Neurological: Positive for headaches.   All other systems reviewed and are negative.    Physical Exam     Patient Vitals for the past 24 hrs:   BP Temp Temp src Pulse Resp SpO2   02/01/20 0405 (!) 143/89 98.1  F (36.7  C) Oral 72 18 98 %       Physical Exam  General: Alert and cooperative with exam. Patient in mild distress. Baseline mentation.  Head:  Scalp is NC/AT  Eyes:  No scleral icterus, PERRL  ENT:  The external nose and ears are normal. The oropharynx is normal and without erythema; mucus membranes are moist.   Neck:  Normal range of motion without rigidity.   CV:  Regular rate and rhythm on initial exam though intermittently bradycardia into the 20s  Resp:  Breath sounds are clear bilaterally    Mildly tachypneic, no  retractions or accessory muscle use  GI:  Abdomen is soft, no distension, no tenderness. No peritoneal signs  MS:  No lower extremity edema   Skin:  Warm and dry, No rash or lesions noted.  Neuro: Oriented x 3. No gross motor deficits.     Emergency Department Course     ECG #1:  ECG taken at 0414, ECG read at 0420 by Dr. Jimenez, DO  Normal sinus rhythm  Left axis deviation  Nonspecific ST abnormality  Abnormal ECG  Rate 63 bpm. KY interval 144. QRS duration 102. QT/QTc 432/442. P-R-T axes 37 -32 65.      ECG #2:  ECG taken at 0532, ECG read at 0545 by Dr. Jimenez, DO  Sinus bradycardia   Otherwise normal ECG  Rate 59 bpm. KY interval 180. QRS duration 104. QT/QTc 446/441. P-R-T axes 72 -23 65.     Imaging:  Radiology findings were communicated with the patient and family who voiced understanding of the findings.    Chest XR, PA & LAT:  IMPRESSION: Patient is mildly rotated to the right. Normal heart size and pulmonary vascularity. No acute appearing infiltrates or consolidation. Tiny calcified granuloma left upper lobe. Tortuous and calcified thoracic aorta. No significant bony   abnormalities. Chest is otherwise negative. No definite acute findings.  Reading per radiology.     Laboratory:  Laboratory findings were communicated with the patient and family who voiced understanding of the findings.    CBC: HGB 12.6 (L) o/w WNL (WBC 8.9, )  BMP: Chloride 111 (H), Glucose 112 (H) o/w WNL (Creatinine 1.04)  Troponin (Collected 0428): 7.889 (H)      Interventions:  Medications   nitroGLYcerin (NITROSTAT) sublingual tablet 0.4 mg (0.4 mg Sublingual Given 2/1/20 6409)   heparin ANTICOAGULANT  Loading Dose bolus dose from infusion pump 3,000 Units (has no administration in time range)   heparin 25,000 units in 0.45% NaCl 250 mL ANTICOAGULANT  infusion (has no administration in time range)   acetaminophen (TYLENOL) tablet 1,000 mg (has no administration in time range)   aspirin (ASA) chewable tablet 324 mg (324 mg  Oral Given 2/1/20 6976)       Emergency Department Course:  Past medical records, nursing notes, and vitals reviewed.    (0405)   I performed an exam of the patient as documented above. History obtained from his wife and son.     EKG obtained in the ED, see results above.     The patient was sent for a Chest XR, PA & LAT while in the emergency department, results above.     IV was inserted and blood was drawn for laboratory testing, results above.     (0421)   Rechecked patient.     (0528)   Updated that troponin has returned to 7.89.    EKG obtained in the ED, see results above.      (0529)   I rechecked the patient and discussed the results of his workup thus far. Discussed plan of care and admission is indicated. Heparin will be initiated.     (9343)   I spoke with Dr. Carter of the Hospitalist service regarding patient's presentation, findings, and plan of care, who agrees to accept patient for further care, monitoring and evaluation.      Findings and plan explained to the Patient and spouse and son who consents to admission. Discussed the patient with Dr. Carter, who [place] [facility] [place], evaluation, and treatment.    I personally reviewed the laboratory and imaging results with the Patient and spouse and son and answered all related questions prior to admission.     Impression & Plan       Medical Decision Making:  Patient is a 89-year-old male who presents with chest pressure and shortness of breath.  Patient's medical history and records were reviewed.  Initial consideration for, but not limited to, arrhythmia, electrolyte abnormality, ACS/MI, infectious process, metabolic disturbance, MSK pain, esophageal spasm/GERD, among others.  Labs, EKG, and imaging was obtained.  Initial EKG demonstrates normal sinus rhythm with mild ST depression in V3; no other significant evidence of acute ischemia, infarction, or or arrhythmia.  Later in ED course patient was noted to have  brief episodes of bradycardia into the 20s/30s that would resolve without intervention.  Patient did report some chest pressure and was provided nitro with no significant change; later provided Tylenol for headache.  Received 324 mg aspirin.  Labs were obtained and notable for significantly elevated troponin.  Patient initiated on heparin ggt.  Repeat EKG demonstrates sinus bradycardia without evidence of ischemia, infarction, or other significant arrhythmia.  Chest x-ray without significant findings.  Patient will be admitted to [facility] with the hospital service for further evaluation and care.  Presentation consistent with sinus node dysfunction and significantly elevated troponin possibly secondary to demand ischemia though underlying ACS not excluded.    Diagnosis:    ICD-10-CM    1. Sinus node dysfunction (H) I49.5    2. Bradycardia R00.1    3. Chest pressure R07.89    4. Elevated troponin R79.89        Disposition:  Admitted to [facility].    Scribe Disclosure:  Tasneem SPENCER, am serving as a scribe at 3:58 AM on 2/1/2020 to document services personally performed by Yosi Jimenez DO based on my observations and the provider's statements to me.   2/1/2020    EMERGENCY DEPARTMENT       Yosi Jimenez DO  02/01/20 0556

## 2020-02-01 NOTE — PHARMACY-ADMISSION MEDICATION HISTORY
Pharmacy Medication History  Admission medication history interview status for the 2/1/2020  admission is complete. See EPIC admission navigator for prior to admission medications     Medication history sources: Patient, Patient's family/friend (Spouse - Shereen) and Surescripts  Medication history source reliability: Good  Adherence assessment: Good    Significant changes made to the medication list:  Deleted: Aspirin (81mg)      Additional medication history information:   Pt recently stopped taking aspirin and lisinopril/hctz a couple days ago.     Medication reconciliation completed by provider prior to medication history? No    Time spent in this activity: 8 minutes      Prior to Admission medications    Medication Sig Last Dose Taking? Auth Provider   acetaminophen (TYLENOL) 500 MG tablet Take 500-1,000 mg by mouth every 4 hours as needed for mild pain (headache)  Past Week at PRN Yes Unknown, Entered By History   cholecalciferol (VITAMIN D3) 125 MCG (5000 UT) TABS tablet Take 5,000 Units by mouth daily 1/31/2020 at AM Yes Unknown, Entered By History   fluticasone (FLONASE) 50 MCG/ACT nasal spray Spray 1-2 sprays into both nostrils daily  1/30/2020 at PM Yes Unknown, Entered By History

## 2020-02-01 NOTE — ED NOTES
[facility]  ED Nurse Handoff Report    ED Chief complaint: Shortness of Breath      ED Diagnosis:   Final diagnoses:   Sinus node dysfunction (H)   Bradycardia   Chest pressure   Elevated troponin       Code Status: Full Code    Allergies: No Known Allergies    Patient Story: Pt presents with complaints of Rapid breathing and shortness of breath. Pt was seen on 1.30 for similar complaints, found to have pauses and periods of bradycardia into 30-40's.   Focused Assessment:  Pauses and bradycardia again found, but into 20's at this time. Periods of SOB coincide with periods of profound bradycardia.     Treatments and/or interventions provided: nitroglycerin, Heparin, ASA, and tylenol  Patient's response to treatments and/or interventions: no changes with nitroglycerin    To be done/followed up on inpatient unit:  Cont to Monitor.     Does this patient have any cognitive concerns?: Alzheimer's    Activity level - Baseline/Home:  Stand with Assist  Activity Level - Current:   Stand with Assist    Patient's Preferred language: English   Needed?: No    Isolation: None  Infection: Not Applicable  Bariatric?: No    Vital Signs:   Vitals:    02/01/20 0405   BP: (!) 143/89   Pulse: 72   Resp: 18   Temp: 98.1  F (36.7  C)   TempSrc: Oral   SpO2: 98%       Cardiac Rhythm:     Was the PSS-3 completed:   Yes  What interventions are required if any?               Family Comments: Wife and Son at bedside  OBS brochure/video discussed/provided to patient/family: N/A              Name of person given brochure if not patient: Na              Relationship to patient: NA    For the majority of the shift this patient's behavior was Green.   Behavioral interventions performed were None needed.    ED NURSE PHONE NUMBER: [id number]

## 2020-02-01 NOTE — CONSULTS
[facility]    Cardiology Consultation     Date of Admission:  2/1/2020    Assessment & Plan     In summary this is a pleasant 89-year-old male with past medical history also was dementia, paroxysmal atrial fibrillation not on anticoagulation who presents with abrupt onset of shortness of breath early this morning with EKG findings of left axis deviation no ST elevations, echocardiogram findings of severely reduced LV function which is new from 2 weeks prior with basal to mid inferior, inferoseptal akinesis and mid anterolateral anteroseptal and apical akinesis.  Findings concerning for multivessel coronary disease and acute coronary syndrome.  Also concerning is severe aortic regurgitation in the absence of a significantly calcified aortic valve.  He has a known aortic root aneurysm based on prior echocardiogram.  Given he is a poor historian and does not remember his presentation or symptoms.  I would like to exclude possibility of aortic syndrome while he is on heparin infusion.  He had no pericardial effusion on echocardiogram or pleural effusion that was noted.  Lengthy discussion was had with family at bedside.  Although he has a DNR/DNI the family is amenable to temporary code reversal to full code for the purpose of coronary angiography.  Patient is without any chest discomfort at this time therefore we will plan for coronary angiography on Monday.  Continue heparin infusion, aspirin loaded.  Unable to beta-blocker in setting of sinus node dysfunction.  Heart rates on telemetry today are not dropping into the 20s.  I have confirmed this is sinus bradycardia. Cause could be from possible sinus bj artery ischemia (off RCA), pauses from increased vagal tone. If able to correct underlying coronary disease then conduction disease may improve. Otherwise we would need to consider pacemaker if patient's family wants everything done. Would re-consider palliative care involvement given new findings  and severely decreased LV function.     Please page with questions. Will continue to follow.    Julieta Mariscal MD    Code Status    DNR/DNI    Reason for Consult   NSTEMI     Primary Care Physician   *Deuce Foy    Chief Complaint    chest discomfort    History of Present Illness     This is a 89-year-old male with past medical history significant for essential hypertension, prostate cancer, Alzheimer's dementia, paroxysmal atrial fibrillation not on any significant medications who has a history of symptomatic sinus pauses, sinus bradycardia.  He was evaluated in the hospital from January 17 to January 28 for this.  He had electrolyte monitoring, thyroid, infection work-up, CT of the head.  This was all unremarkable.  Echocardiogram demonstrated aortic root aneurysm with ejection fraction of 55 to 60% with mild TR and mild AI.  On telemetry monitoring he had atrial fibrillation without RVR, intermittent junctional rhythm in the 40s and occasional sinus pauses of less than 4 seconds from reset causing.  EP evaluated patient who decided that pacemaker placement would not do much to improve his quality of life due to his advanced dementia.  Patient did not meet criteria at that time for hospice.  Discharged home in stable condition.  He had no syncope upon discharge.  Yesterday patient returned to the hospital with significant fatigue and complained to wife of chest discomfort.  Denies chest pain.  He woke up at 2 AM when he was very short of breath having difficulty breathing.  The wife checked his pulse and it was in the 20s.  He was brought to the emergency department.  EKG in the emergency department showed heart rate of 52 with left axis deviation and sinus bradycardia.  He was started on heparin infusion for possible acute coronary syndrome.  Initial troponin was 7.8 and down trended to 5.4.  Kidney function normal at 1.05.  He was admitted to medicine for further management.  Cardiology consulted for  NSTEMI.  We obtained an echocardiogram demonstrating severely reduced LV function and multiple wall motion abnormalities consistent with probable ischemic cardiomyopathy (multivessel) versus stress myopathy.  However of concern was also severe aortic regurgitation and ongoing known aortic root aneurysm.  Aortic tubular portion was not well visualized on the echocardiogram.      Past Medical History   I have reviewed this patient's medical history and updated it with pertinent information if needed.   Past Medical History:   Diagnosis Date     Chronic renal insufficiency     hx kidney stones     Dementia (H)      Hypertension      Paroxysmal atrial fibrillation (H)      Sinus node dysfunction (H)        Past Surgical History   I have reviewed this patient's surgical history and updated it with pertinent information if needed.  Past Surgical History:   Procedure Laterality Date     GENITOURINARY SURGERY      kidney stone procedures     HERNIORRHAPHY INGUINAL  7/28/2014    Procedure: HERNIORRHAPHY INGUINAL;  Surgeon: Augie Block MD;  [facility]: [facility]     PROSTATE SURGERY  1993       Prior to Admission Medications   Prior to Admission Medications   Prescriptions Last Dose Informant Patient Reported? Taking?   acetaminophen (TYLENOL) 500 MG tablet Past Week at PRN Spouse/Significant Other Yes Yes   Sig: Take 500-1,000 mg by mouth every 4 hours as needed for mild pain (headache)    cholecalciferol (VITAMIN D3) 125 MCG (5000 UT) TABS tablet 1/31/2020 at AM Spouse/Significant Other Yes Yes   Sig: Take 5,000 Units by mouth daily   fluticasone (FLONASE) 50 MCG/ACT nasal spray 1/30/2020 at PM Spouse/Significant Other Yes Yes   Sig: Spray 1-2 sprays into both nostrils daily       Facility-Administered Medications: None     Allergies   No Known Allergies    Social History   I have reviewed this patient's social history and updated it with pertinent information if needed. Nahun Han  reports that he has quit smoking. He  does not have any smokeless tobacco history on file. He reports current alcohol use. He reports that he does not use drugs.    Family History   I have reviewed this patient's family history and updated it with pertinent information if needed.   No family history on file.    Review of Systems   The 10 point Review of Systems is negative other than noted in the HPI or here.     Physical Exam   Temp: 98.1  F (36.7  C) Temp src: Oral BP: 119/65 Pulse: 60 Heart Rate: (!) 34 Resp: 20 SpO2: 96 % O2 Device: None (Room air)    Vital Signs with Ranges  Temp:  [98.1  F (36.7  C)] 98.1  F (36.7  C)  Pulse:  [33-72] 60  Heart Rate:  [29-61] 34  Resp:  [10-27] 20  BP: (101-143)/(61-89) 119/65  SpO2:  [95 %-98 %] 96 %  0 lbs 0 oz    Constitutional: Awake, alert, cooperative, no apparent distress.  Eyes: Conjunctiva and pupils examined and normal.  HEENT: Moist mucous membranes, normal dentition.  Respiratory: Clear to auscultation bilaterally, no crackles or wheezing.  Cardiovascular: Bradycardic, normal S1, S2. Holoystolic murmur and diastolic murmur.  GI: Soft, non-distended, non-tender, normal bowel sounds.  Lymph/Hematologic: No anterior cervical or supraclavicular adenopathy.  Skin: No rashes, no cyanosis, no edema.  Musculoskeletal: No joint swelling, erythema or tenderness.  Neurologic: Cranial nerves 2-12 intact, normal strength and sensation.  Psychiatric: Alert, oriented to person only.    Data   Results for orders placed or performed during the hospital encounter of 02/01/20 (from the past 24 hour(s))   EKG 12 lead   Result Value Ref Range    Interpretation ECG Click View Image link to view waveform and result    CBC with platelets + differential   Result Value Ref Range    WBC 8.9 4.0 - 11.0 10e9/L    RBC Count 4.02 (L) 4.4 - 5.9 10e12/L    Hemoglobin 12.6 (L) 13.3 - 17.7 g/dL    Hematocrit 38.0 (L) 40.0 - 53.0 %    MCV 95 78 - 100 fl    MCH 31.3 26.5 - 33.0 pg    MCHC 33.2 31.5 - 36.5 g/dL    RDW 13.3 10.0 - 15.0 %     Platelet Count 210 150 - 450 10e9/L    Diff Method Automated Method     % Neutrophils 80.1 %    % Lymphocytes 10.9 %    % Monocytes 7.6 %    % Eosinophils 1.0 %    % Basophils 0.2 %    % Immature Granulocytes 0.2 %    Absolute Neutrophil 7.2 1.6 - 8.3 10e9/L    Absolute Lymphocytes 1.0 0.8 - 5.3 10e9/L    Absolute Monocytes 0.7 0.0 - 1.3 10e9/L    Absolute Eosinophils 0.1 0.0 - 0.7 10e9/L    Absolute Basophils 0.0 0.0 - 0.2 10e9/L    Abs Immature Granulocytes 0.0 0 - 0.4 10e9/L   Basic metabolic panel   Result Value Ref Range    Sodium 142 133 - 144 mmol/L    Potassium 4.4 3.4 - 5.3 mmol/L    Chloride 111 (H) 94 - 109 mmol/L    Carbon Dioxide 28 20 - 32 mmol/L    Anion Gap 3 3 - 14 mmol/L    Glucose 112 (H) 70 - 99 mg/dL    Urea Nitrogen 14 7 - 30 mg/dL    Creatinine 1.04 0.66 - 1.25 mg/dL    GFR Estimate 62 >60 mL/min/[1.73_m2]    GFR Estimate If Black 72 >60 mL/min/[1.73_m2]    Calcium 8.7 8.5 - 10.1 mg/dL   Troponin I   Result Value Ref Range    Troponin I ES 7.889 (HH) 0.000 - 0.045 ug/L   Chest XR,  PA & LAT    Narrative    EXAM: XR CHEST 2 VIEW  LOCATION: [facility]  DATE/TIME: 02/01/2020, 4:19 AM    INDICATION: Chest pain.  COMPARISON: None.      Impression    IMPRESSION: Patient is mildly rotated to the right. Normal heart size and pulmonary vascularity. No acute appearing infiltrates or consolidation. Tiny calcified granuloma left upper lobe. Tortuous and calcified thoracic aorta. No significant bony   abnormalities. Chest is otherwise negative. No definite acute findings.     Troponin I   Result Value Ref Range    Troponin I ES 6.904 (HH) 0.000 - 0.045 ug/L   CBC with platelets   Result Value Ref Range    WBC 8.1 4.0 - 11.0 10e9/L    RBC Count 3.87 (L) 4.4 - 5.9 10e12/L    Hemoglobin 11.9 (L) 13.3 - 17.7 g/dL    Hematocrit 36.5 (L) 40.0 - 53.0 %    MCV 94 78 - 100 fl    MCH 30.7 26.5 - 33.0 pg    MCHC 32.6 31.5 - 36.5 g/dL    RDW 13.4 10.0 - 15.0 %    Platelet Count 203 150 - 450 10e9/L    Heparin 10a Level   Result Value Ref Range    Heparin 10A Level 0.13 IU/mL   Echocardiogram Limited    Narrative    [id number]  [id number]  [id number]  [id number]^KAROL^ROB           [facility]  Echocardiography Laboratory  [facility] [place] [address]        Name: GINNY GONZALEZ  MRN: [id number]  : 1928  Study Date: 2020 02:08 PM  Age: 89 yrs  Gender: Male  Patient Location: [facility]  Reason For Study: Acute Myocardial Infarction  Ordering Physician: ROB ROBERSON  Referring Physician: Deuce Foy  Performed By: Colleen Alicea     BSA: 1.6 m2  Height: 66 in  Weight: 111 lb  HR: 71  BP: 119/65 mmHg  _____________________________________________________________________________  __     _____________________________________________________________________________  __        Interpretation Summary     Left ventricular systolic function is moderately reduced.  The visual ejection fraction is estimated at 30-35%.  Base-mid inferior and inferoseptal akinesis. Mid anterolateral, anteroseptal  and apical akinesis. No evidence of LV thrombus.  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  The right ventricle is normal in structure, function and size.  Severe (>55mmHg) pulmonary hypertension is present.  There is mod-severe to severe (3-4+) aortic regurgitation.  Severe TR.  The aortic root is not well visualized.  There is no pericardial effusion.     Compared to study dated 2020, there is significant drop in LV function  with above mentioned multisegment wall motion abnormalities concerning for  ischemia versus stress cardiomyopathy. There is now severe pulmonary  hypertension, as well as moderate-severe AI. The aortic root was not well  visualized. Given acute worsening in AI and prior known aortic aneurysm, would  consider CT angiogram chest and coronary  evaluation.  _____________________________________________________________________________  __        Left Ventricle  The left ventricle is moderately dilated. There is normal left ventricular  wall thickness. The visual ejection fraction is estimated at 30-35%. Left  ventricular systolic function is moderately reduced. Base-mid inferior and  inferoseptal akinesis. Mid anterolateral, anteroseptal and apical akinesis. No  evidence of LV thrombus. 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     Right Ventricle  The right ventricle is normal in structure, function and size.     Atria  The left atrium is moderately dilated. Right atrial size is normal.     Mitral Valve  The mitral valve is normal in structure and function. There is trace mitral  regurgitation.        Tricuspid Valve  Normal tricuspid valve. The right ventricular systolic pressure is  approximated at 80.3 mmHg plus the right atrial pressure. Severe (>55mmHg)  pulmonary hypertension is present. There is severe (4+) tricuspid  regurgitation.     Aortic Valve  The aortic valve is trileaflet. There is mod-severe to severe (3-4+) aortic  regurgitation.     Pulmonic Valve  The pulmonic valve is normal in structure and function.     Vessels  The aortic root is not well visualized. Dilation of the inferior vena cava is  present with abnormal respiratory variation in diameter. IVC diameter >2.1 cm  collapsing <50% with sniff suggests a high RA pressure estimated at 15 mmHg or  greater.     Pericardium  There is no pericardial effusion.     _____________________________________________________________________________  __  MMode/2D Measurements & Calculations  IVSd: 1.0 cm  LVIDd: 4.3 cm  LVIDs: 3.1 cm  LVPWd: 1.1 cm  FS: 26.9 %  LV mass(C)d: 156.7 grams  LV mass(C)dI: 100.6 grams/m2     RWT: 0.53        Doppler Measurements & Calculations  AI P1/2t: 271.5 msec  TR max alexandra: 402.7 cm/sec  TR max P.3 mmHg            _____________________________________________________________________________  __           Report approved by: Hung Allen 02/01/2020 03:06 PM      Troponin I   Result Value Ref Range    Troponin I ES 5.425 (HH) 0.000 - 0.045 ug/L       Echocardiogram 1/18/2020  Interpretation Summary     Mild aortic root dilatation.  The ascending aorta is Mildly dilated.  The visual ejection fraction is estimated at 55-60%.  The left atrium is mildly dilated.  There is mild (1+) tricuspid regurgitation.  Right ventricular systolic pressure is normal.  There is mild (1+) aortic regurgitation.    Echocardiogram 2/1/2020  Interpretation Summary     Left ventricular systolic function is moderately reduced.  The visual ejection fraction is estimated at 30-35%.  Base-mid inferior and inferoseptal akinesis. Mid anterolateral, anteroseptal  and apical akinesis. No evidence of LV thrombus.  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  The right ventricle is normal in structure, function and size.  Severe (>55mmHg) pulmonary hypertension is present.  There is mod-severe to severe (3-4+) aortic regurgitation.  Severe TR.  The aortic root is not well visualized.  There is no pericardial effusion.     Compared to study dated 1/18/2020, there is significant drop in LV function  with above mentioned multisegment wall motion abnormalities concerning for  ischemia versus stress cardiomyopathy. There is now severe pulmonary  hypertension, as well as moderate-severe AI. The aortic root was not well  visualized. Given acute worsening in AI and prior known aortic aneurysm, would  consider CT angiogram chest and coronary evaluation.  _____________________________________________________________________________

## 2020-02-01 NOTE — ED TRIAGE NOTES
Patient reports shortness of breath and chest pain this morning around 0200. Was in bed most of the day due to fatigue.

## 2020-02-01 NOTE — ED NOTES
DATE:  2/1/2020   TIME OF RECEIPT FROM LAB:  0529  LAB TEST:  Trop  LAB VALUE:  7.889  RESULTS GIVEN WITH READ-BACK TO (PROVIDER): [place]  TIME LAB VALUE REPORTED TO PROVIDER:  0529

## 2020-02-01 NOTE — H&P
[facility]    History and Physical : Hospitalist Service:        Date of Admission:  2/1/2020    Cumulative Summary:   Nahun Han is a 89 year old male with past medical history significant for essential hypertension, distant history of prostate cancer with prostatectomy, Alzheimer's dementia, paroxysmal atrial fibrillation which was noticed during his last hospitalization currently not taking any home medications other than Flonase who was recently admitted to the hospital from January 17 to January 20 for symptomatic sinus pauses, bradycardia and S-A node dysfunction is admitted from emergency room this morning when he presented with shortness of breath and some chest discomfort and was found to have significantly elevated troponin concerning for possible non-STEMI and symptomatic bradycardia    Assessment & Plan     Principal Problem:  NSTEMI (non-ST elevated myocardial infarction) (H) (2/1/2020): Patient is currently chest pain-free, patient does not have any known history of coronary artery disease, echocardiogram done during last hospitalization showed mild aortic root dilatation with estimated ejection fraction around 55 to 60% with mild TR and mild AR without any wall motion abnormalities.  At this point it is not clear if patient has non-STEMI versus type II non-STEMI from supply demand mismatch in the picture of severe symptomatic bradycardia: Patient has not been feeling well for the past few days, has been more tired and fatigue, was also noticed to be having significantly shortness of breath when he woke up around 2 AM this morning his heart rate was dropping in 20s, his shortness of breath has improved.  Patient was evaluated by EP during his last hospitalization and there was concern regarding patient being impulsive, if he is a good candidate for pacemaker placement as he was at risk for pulling the wires and at that time family decided not to proceed with pacemaker placement.   Patient was also evaluated by palliative team but did not qualify for hospice criteria at that time.His symptoms were thought to be most likely secondary to intrinsic conduction disease  Paroxysmal atrial fibrillation: Was noticed to be in PAF during last hospitalization and was also going in sinus pauses when flipping into sinus rhythm.  History of essential hypertension: During his last hospitalization due to past patient presentation with acute kidney injury from dehydration and given his borderline blood pressure his lisinopril and hydrochlorothiazide was stopped.    --Admit patient to cardiac floor with telemetry.  --We will keep patient n.p.o. till evaluation by cardiology.  --Activity with assist.  --Patient has been a started on heparin infusion.  --We will check troponin to 4 hours X 2  --Start patient on aspirin 325 mg p.o. daily.  --Patient is not a started on any beta-blocker due to ongoing symptomatic bradycardia.  --Currently patient is not taking any medications and has a stop taking aspirin and lisinopril for the past few days.  --Patient is also started on normal saline as patient is currently n.p.o. and in case if he will be proceeding with angiogram.  --We will start patient on simvastatin 20 mg at this point.  --Long conversation with patient but mainly with his wife and son regarding goals of care, at this point we are open to the idea of having further discussion with cardiology if they can prove seed with angiogram if needed and if patient needs to have once again further discussion regarding pacemaker.  --Extensive amount of time was again spent also discussion regarding his CODE STATUS , as initially wife was thinking that maybe he should be resuscitated and making him full code but on further discussion the decided for keeping patient DNR/DNI but were okay for him to be intubated for any procedure if needed.  --Cardiology is consulted, appreciate their help.    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  Used to be in an assisted living facility, his wife did not like it and has subsequently transition him to home with his son in October 2019.  During his last hospitalization, initially patient was quite impulsive, needed sitter by bedside.  He was also evaluated by PT and OT who recommended home with 24-hour supervision.  There were also long discussion with patient family regarding hospice and comfort care.  Family was open to meeting hospice and met with hospice team but patient did not meet criteria for hospice.  And patient was discharged home with family.  Home RN and home  for home safety evaluation was ordered.  -- fall precautions , if needed then will order sitter     Prostate cancer (H) (10/6/2011): no ongoing active issues     Diet: NPO  Ventura Catheter: not present  DVT Prophylaxis: on heparin infusion  Code Status: DNR / DNI, ok to be intubated for procedure , long discussion with family as above     Disposition: Expected discharge in 2-3 days as he continues to make clinical improvement     The patient's care was discussed with the Patient, Patient's Family and ER Team.    Chen Gagnon MD,[facility] medicine     ----------------------------------------------------------------------------------------------------------------------    Primary Care Physician   Deuce Foy    Chief Complaint   Shortness of breath, dropping pulse rate    History is mostly obtained from wife and son present at the bedside, patient does have history of dementia and was not able to provide any significant history.    Patient Active Problem List   Diagnosis     Syncope     Hypokalemia     ARF (acute renal failure) (H)     Hypotension     Fever     Prostate cancer (H)     Vertigo     Sinus pause     Dementia (H)     Hypertension     Paroxysmal atrial fibrillation (H)     Sinus node dysfunction (H)     NSTEMI (non-ST  elevated myocardial infarction) (H)     Symptomatic bradycardia       History of Present Illness   Nahun Han is a 89 year old male with past medical history significant for essential hypertension, distant history of prostate cancer with prostatectomy, Alzheimer's dementia, paroxysmal atrial fibrillation although wife is denying it, currently not taking any significant home medications other than Flonase who was recently admitted to the hospital from January 17 to January 28 when he was evaluated for symptomatic sinus pauses, bradycardia with SA node dysfunction.    It seems like patient underwent thorough work-up for his symptomatic sinus bradycardia including electrolyte monitoring, thyroid, infection work-up, CT scan of the head which was negative, chest x-ray was negative for any acute pathology, echocardiogram showed mild aortic root dilatation with estimated ejection fraction around 55 to 60% with mild tricuspid regurgitation and mild aortic regurgitation.  During the hospitalization the telemetry showed paroxysmal A. fib without RVR, intermittent junctional rhythm in the 40s and occasional sinus asystole less than 4 seconds upon termination of A. fib.  Discussions were held with EP there was impression for sinus node dysfunction from intrinsic conduction disease unlikely to be an incidental finding, at that time there was unsure ED if pacemaker placement would do anything to improve quality of life due to patient advanced dementia and being impulsive.  Family actually also met with palliative team and hospice team during the hospitalization but patient did not meet the criteria for hospice.  Patient was discharged home in a stable condition.    Patient was noticed to be significantly fatigued and slept multiple hours yesterday.  Patient woke up around 2 AM when he was very short of breath and was having difficulty in breathing, wife checked her blood pressure which was okay initially his pulse was okay  but then it dropped and 20s and family brought him to the emergency room for further evaluation.  Initially patient did not complain of any chest tightness but his main symptoms were mainly shortness of breath.  On evaluation in the emergency room patient also was complaining of some chest discomfort.  CT scan of the head was negative for any acute intracranial abnormality, chest ray was negative for any effusion, pneumothorax or any signs of congestive heart failure.  Initial EKG showed heart rate of 52 with left axis deviation and sinus bradycardia.  On telemetry patient continues to drop in upper 20s to upper 50s.. His initial troponin came back elevated around 7.8 and at that time patient was also started on heparin infusion due to the concern for possible acute coronary syndrome and was requested to be admitted for further evaluation.At the time of my evaluation patient is denying any chest pain or shortness of breath, wife thinks that her breathing is better his heart rate is mostly in 40s but dropped twice in the 20s during my evaluation for few seconds.  W    Review of Systems   Was unable to obtain review of system due to patient dementia, patient mostly answered no to everything.    Past Medical History    I have reviewed this patient's medical history and updated it with pertinent information if needed.   Past Medical History:   Diagnosis Date     Chronic renal insufficiency     hx kidney stones     Dementia (H)      Hypertension      Paroxysmal atrial fibrillation (H)      Sinus node dysfunction (H)        Past Surgical History   I have reviewed this patient's surgical history and updated it with pertinent information if needed.  Past Surgical History:   Procedure Laterality Date     GENITOURINARY SURGERY      kidney stone procedures     HERNIORRHAPHY INGUINAL  7/28/2014    Procedure: HERNIORRHAPHY INGUINAL;  Surgeon: Augie Block MD;  [facility]: [facility]     PROSTATE SURGERY  1993       Prior to  Admission Medications   Prior to Admission Medications   Prescriptions Last Dose Informant Patient Reported? Taking?   acetaminophen (TYLENOL) 500 MG tablet   Yes No   Sig: Take 500-1,000 mg by mouth every 4 hours as needed   aspirin 81 MG tablet   Yes No   Sig: Take by mouth daily   cholecalciferol (VITAMIN D3) 125 MCG (5000 UT) TABS tablet   Yes No   Sig: Take 5,000 Units by mouth daily   fluticasone (FLONASE) 50 MCG/ACT nasal spray   Yes No   Sig: Spray 1-2 sprays in nostril daily as needed       Facility-Administered Medications: None     Allergies   No Known Allergies    Social History   I have reviewed this patient's social history and updated it with pertinent information if needed. Nahun LUO Guille  reports that he has quit smoking. He does not have any smokeless tobacco history on file. He reports current alcohol use. He reports that he does not use drugs.    Family History   I have reviewed this patient's family history and updated it with pertinent information if needed.   No family history on file.    Physical Exam   Temp: 98.1  F (36.7  C) Temp src: Oral BP: 114/66 Pulse: 60 Heart Rate: 60 Resp: 21 SpO2: 96 % O2 Device: None (Room air)    Vital Signs with Ranges  Temp:  [98.1  F (36.7  C)] 98.1  F (36.7  C)  Pulse:  [33-72] 60  Heart Rate:  [29-61] 60  Resp:  [10-27] 21  BP: (101-143)/(61-89) 114/66  SpO2:  [95 %-98 %] 96 %  0 lbs 0 oz    Constitutional: Awake, alert,oriented to person , cooperative, no apparent distress.Pleasent and cooperative , family present at the bedside , thin   Eyes: Conjunctiva and pupils examined and normal.  HEENT: Moist mucous membranes, normal dentition.  Respiratory: Clear to auscultation bilaterally, no crackles or wheezing.  Cardiovascular: Regular rate and rhythm, becomes gonzalo on and off , normal S1 and S2, and no murmur noted.  GI: Soft, non-distended, non-tender, normal bowel sounds.  Lymph/Hematologic: No anterior cervical or supraclavicular adenopathy.  Skin: No  rashes, no cyanosis, no edema.  Musculoskeletal: No joint swelling, erythema or tenderness.  Neurologic: Cranial nerves 2-12 intact, normal strength and sensation.  Psychiatric: Alert, oriented to person, no obvious anxiety or depression.    Data   Data reviewed today:  I personally reviewed EKG which is showing normal sinus rhythm with left axis deviation, telemetry is showing sinus bradycardia on and off, chest x-ray is showing no acute finding.    .  Recent Labs   Lab 02/01/20  0428 01/30/20  1203   WBC 8.9 5.4   HGB 12.6* 12.0*   MCV 95 95    182    139   POTASSIUM 4.4 4.6   CHLORIDE 111* 108   CO2 28 29   BUN 14 14   CR 1.04 1.05   ANIONGAP 3 2*   ASHISH 8.7 8.5   * 91   ALBUMIN  --  3.1*   PROTTOTAL  --  6.2*   BILITOTAL  --  0.6   ALKPHOS  --  101   ALT  --  30   AST  --  24   TROPI 7.889* <0.015       Imaging:  Recent Results (from the past 24 hour(s))   Chest XR,  PA & LAT    Narrative    EXAM: XR CHEST 2 VIEW  LOCATION: [facility]  DATE/TIME: 02/01/2020, 4:19 AM    INDICATION: Chest pain.  COMPARISON: None.      Impression    IMPRESSION: Patient is mildly rotated to the right. Normal heart size and pulmonary vascularity. No acute appearing infiltrates or consolidation. Tiny calcified granuloma left upper lobe. Tortuous and calcified thoracic aorta. No significant bony   abnormalities. Chest is otherwise negative. No definite acute findings.

## 2020-02-01 NOTE — PLAN OF CARE
PT/OT/CR: Orders received and chart reviewed. Pt is a 89 year old male admitted this morning with chest pain and NSTEMI. Pt currently NPO and awaiting cards consult. Will hold on therapy evaluations and allow for further medical management prior to initiating therapy.

## 2020-02-02 PROBLEM — R00.1 BRADYCARDIA: Status: ACTIVE | Noted: 2020-01-01

## 2020-02-02 NOTE — PROGRESS NOTES
[facility]    Cardiology Progress Note     Assessment & Plan     In summary this is a pleasant 89-year-old male with past medical history also was dementia, paroxysmal atrial fibrillation not on anticoagulation who presents with abrupt onset of shortness of breath one day PTA with EKG findings of left axis deviation no ST elevations, echocardiogram findings of severely reduced LV function which is new from 2 weeks prior with basal to mid inferior, inferoseptal akinesis and mid anterolateral anteroseptal and apical akinesis. On tele he is having severe SA bj dysfunction and resultant hypotension requiring dopamine infusion and atropine. Troponin peaked to > 6 and is downtrending.    Findings concerning for multivessel coronary disease and acute coronary syndrome. Given large territory on echocardiogram, although acute presentation suspect he has some long standing chronic CAD as well given age. Suspect possible RCA disease and ischemia of sinus bj branch leading to SA node dysfunction over course of past two weeks.  He is having episodic bradycardia, pauses and hypotension now on dopamine, atropine as needed.    Lengthy discussion was had with family at bedside.  Although he has a DNR/DNI the family is amenable to temporary code reversal to full code for the purpose of coronary angiography and pacemaker if needed. Discussed palliative options as well which they are not yet amenable to, unless patient has severe multivessel disease and non viability, making few options for ischemic reversal with PCI.     Patient is without any chest discomfort at this time therefore we will plan for coronary angiography on Monday, unless overnight has unstable bradycardia not responsive to dopamine and atropine by bedside. as long as HR can bounce back or be taken over by junctional escape within about 20-30 seconds, BP has maintained stable. Beyond 30 seconds of bradycardia is when he gets hypotensive and drops  cardiac output significantly. Please call cardiology when this happens and administer 5 mg atropine if needed.      Continue heparin infusion, aspirin loaded, statin. Unable to beta-blocker in setting of sinus node dysfunction.         Julieta Mariscal MD  Text Page  (Monday - Friday, 8 am- 5 pm)    Interval History    -became hypotensive with sustained HR drop of 60 seconds      Physical Exam   Temp: 97.9  F (36.6  C) Temp src: Oral BP: 109/63 Pulse: 70 Heart Rate: 70 Resp: 22 SpO2: 100 % O2 Device: Nasal cannula Oxygen Delivery: 3 LPM  Vitals:    02/02/20 0654   Weight: 52.8 kg (116 lb 6.5 oz)     Vital Signs with Ranges  Temp:  [97.5  F (36.4  C)-98.1  F (36.7  C)] 97.9  F (36.6  C)  Pulse:  [38-84] 70  Heart Rate:  [41-83] 70  Resp:  [12-37] 22  BP: ()/(42-79) 109/63  SpO2:  [91 %-100 %] 100 %  I/O last 3 completed shifts:  In: 1875 [P.O.:600; I.V.:1275]  Out: 150 [Urine:150]  Patient Active Problem List   Diagnosis     Syncope     Hypokalemia     ARF (acute renal failure) (H)     Hypotension     Fever     Prostate cancer (H)     Vertigo     Sinus pause     Dementia (H)     Hypertension     Paroxysmal atrial fibrillation (H)     Sinus node dysfunction (H)     NSTEMI (non-ST elevated myocardial infarction) (H)     Symptomatic bradycardia     SOB (shortness of breath) on exertion     ACS (acute coronary syndrome) (H)       Constitutional: Awake, alert, cooperative, no apparent distress  Respiratory: Clear to auscultation bilaterally, no crackles or wheezing  Cardiovascular: Regular rate and rhythm, normal S1 and S2, and soft holoystolic murmur noted  GI: Normal bowel sounds, soft, non-distended, non-tender  Skin/Integumen: No rashes, no cyanosis, no edema  Other:      Medications     - MEDICATION INSTRUCTIONS -       DOPamine 5 mcg/kg/min (02/02/20 1018)     HEParin 700 Units/hr (02/02/20 1307)     - MEDICATION INSTRUCTIONS -       - MEDICATION INSTRUCTIONS -       ACE/ARB/ARNI NOT PRESCRIBED          aspirin  325 mg Oral Daily     influenza Vac Split High-Dose  0.5 mL Intramuscular Prior to discharge     QUEtiapine  25 mg Oral At Bedtime     senna-docusate  1 tablet Oral BID    Or     senna-docusate  2 tablet Oral BID     simvastatin  20 mg Oral QPM     sodium chloride (PF)  3 mL Intracatheter Q8H       Data      Admission on 01/30/2020, Discharged on 01/30/2020   Component Date Value Ref Range Status     Interpretation ECG 01/30/2020 Click View Image link to view waveform and result   Final     WBC 01/30/2020 5.4  4.0 - 11.0 10e9/L Final     RBC Count 01/30/2020 3.85* 4.4 - 5.9 10e12/L Final     Hemoglobin 01/30/2020 12.0* 13.3 - 17.7 g/dL Final     Hematocrit 01/30/2020 36.4* 40.0 - 53.0 % Final     MCV 01/30/2020 95  78 - 100 fl Final     MCH 01/30/2020 31.2  26.5 - 33.0 pg Final     MCHC 01/30/2020 33.0  31.5 - 36.5 g/dL Final     RDW 01/30/2020 13.4  10.0 - 15.0 % Final     Platelet Count 01/30/2020 182  150 - 450 10e9/L Final     Diff Method 01/30/2020 Automated Method   Final     % Neutrophils 01/30/2020 64.9  % Final     % Lymphocytes 01/30/2020 20.9  % Final     % Monocytes 01/30/2020 10.4  % Final     % Eosinophils 01/30/2020 3.0  % Final     % Basophils 01/30/2020 0.6  % Final     % Immature Granulocytes 01/30/2020 0.2  % Final     Nucleated RBCs 01/30/2020 0  0 /100 Final     Absolute Neutrophil 01/30/2020 3.5  1.6 - 8.3 10e9/L Final     Absolute Lymphocytes 01/30/2020 1.1  0.8 - 5.3 10e9/L Final     Absolute Monocytes 01/30/2020 0.6  0.0 - 1.3 10e9/L Final     Absolute Eosinophils 01/30/2020 0.2  0.0 - 0.7 10e9/L Final     Absolute Basophils 01/30/2020 0.0  0.0 - 0.2 10e9/L Final     Abs Immature Granulocytes 01/30/2020 0.0  0 - 0.4 10e9/L Final     Absolute Nucleated RBC 01/30/2020 0.0   Final     Sodium 01/30/2020 139  133 - 144 mmol/L Final     Potassium 01/30/2020 4.6  3.4 - 5.3 mmol/L Final    Specimen slightly hemolyzed, potassium may be falsely elevated     Chloride 01/30/2020 108  94 - 109  mmol/L Final     Carbon Dioxide 01/30/2020 29  20 - 32 mmol/L Final     Anion Gap 01/30/2020 2* 3 - 14 mmol/L Final     Glucose 01/30/2020 91  70 - 99 mg/dL Final     Urea Nitrogen 01/30/2020 14  7 - 30 mg/dL Final     Creatinine 01/30/2020 1.05  0.66 - 1.25 mg/dL Final     GFR Estimate 01/30/2020 61  >60 mL/min/[1.73_m2] Final    Comment: Non  GFR Calc  Starting 12/18/2018, serum creatinine based estimated GFR (eGFR) will be   calculated using the Chronic Kidney Disease Epidemiology Collaboration   (CKD-EPI) equation.       GFR Estimate If Black 01/30/2020 71  >60 mL/min/[1.73_m2] Final    Comment:  GFR Calc  Starting 12/18/2018, serum creatinine based estimated GFR (eGFR) will be   calculated using the Chronic Kidney Disease Epidemiology Collaboration   (CKD-EPI) equation.       Calcium 01/30/2020 8.5  8.5 - 10.1 mg/dL Final     Bilirubin Total 01/30/2020 0.6  0.2 - 1.3 mg/dL Final     Albumin 01/30/2020 3.1* 3.4 - 5.0 g/dL Final     Protein Total 01/30/2020 6.2* 6.8 - 8.8 g/dL Final     Alkaline Phosphatase 01/30/2020 101  40 - 150 U/L Final     ALT 01/30/2020 30  0 - 70 U/L Final     AST 01/30/2020 24  0 - 45 U/L Final    Comment: Specimen is hemolyzed which can falsely elevate AST. Analysis of a   non-hemolyzed specimen may result in a lower value.       Troponin I ES 01/30/2020 <0.015  0.000 - 0.045 ug/L Final    Comment: The 99th percentile for upper reference range is 0.045 ug/L.  Troponin values   in the range of 0.045 - 0.120 ug/L may be associated with risks of adverse   clinical events.       N-Terminal Pro BNP Inpatient 01/30/2020 2,000* 0 - 1,800 pg/mL Final    Comment:    Reference range shown and results flagged as abnormal are suggested inpatient   cut points for confirming diagnosis if CHF in an acute setting. Establishing a   baseline value for each individual patient is useful for follow-up. An   inpatient or emergency department NT-proPBNP <300 pg/mL effectively  rules out   acute CHF, with 99% negative predictive value.  The outpatient non-acute reference range for ruling out CHF is:   0-125 pg/mL (age 18 to less than 75)   0-450 pg/mL (age 75 yrs and older)       Color Urine 01/30/2020 Yellow   Final     Appearance Urine 01/30/2020 Clear   Final     Glucose Urine 01/30/2020 Negative  NEG^Negative mg/dL Final     Bilirubin Urine 01/30/2020 Negative  NEG^Negative Final     Ketones Urine 01/30/2020 Negative  NEG^Negative mg/dL Final     Specific Gravity Urine 01/30/2020 1.008  1.003 - 1.035 Final     Blood Urine 01/30/2020 Negative  NEG^Negative Final     pH Urine 01/30/2020 7.0  5.0 - 7.0 pH Final     Protein Albumin Urine 01/30/2020 Negative  NEG^Negative mg/dL Final     Urobilinogen mg/dL 01/30/2020 Normal  0.0 - 2.0 mg/dL Final     Nitrite Urine 01/30/2020 Negative  NEG^Negative Final     Leukocyte Esterase Urine 01/30/2020 Negative  NEG^Negative Final     Source 01/30/2020 Midstream Urine   Final     WBC Urine 01/30/2020 <1  0 - 5 /HPF Final     RBC Urine 01/30/2020 0  0 - 2 /HPF Final

## 2020-02-02 NOTE — PROGRESS NOTES
[facility]    HOSPITALIST PROGRESS NOTE :   --------------------------------------------------    Date of Admission:  2/1/2020    Cumulative Summary: Nahun Han is a 89 year old male with past medical history significant for essential hypertension, distant history of prostate cancer with prostatectomy, Alzheimer's dementia, paroxysmal atrial fibrillation which was noticed during his last hospitalization currently not taking any home medications other than Flonase who was recently admitted to the hospital from January 17 to January 20 for symptomatic sinus pauses, bradycardia and S-A node dysfunction is admitted from emergency room this morning when he presented with shortness of breath and some chest discomfort and was found to have significantly elevated troponin concerning for possible non-STEMI and symptomatic bradycardia    Assessment & Plan     NSTEMI (non-ST elevated myocardial infarction) (H) (2/1/2020):   Patient is currently chest pain-free, patient does not have any known history of coronary artery disease, echocardiogram done during last hospitalization showed mild aortic root dilatation with estimated ejection fraction around 55 to 60% with mild TR and mild AR without any wall motion abnormalities.  Initially on admission it was not clear if patient has non-STEMI versus type II non-STEMI from supply demand mismatch in the picture of bradycardia.  Echo done during this admission showed significant change with drop in ejection fraction to 30-35%, there is multisegment wall motion abnormalities concerning for ischemia versus a stress cardiomyopathy with severe pulmonary hypertension which is also new as well as moderate to severe aortic insufficiency.  Given acute worsening in aortic insufficiency and prior known aortic aneurysm, recommending CT angiogram of chest and coronary evaluation.  Severe symptomatic bradycardia:   Patient was not feeling well in the past week, more tired and  fatigue, presented to ER with worsening shortness of breath and was found to be bradycardic with heart rate in 20,s and was admitted for further evaluation and management.  Patient was recently admitted 2 weeks ago, at that time patient was evaluated in detail by EP and there was concern for patient post operative delivery due to patient being impulsive due to his underlying severe dementia and at risk for pulling the wires and not able to follow-up postoperative instructions well.  During his last hospitalization patient was also evaluated by palliative team but he did not qualify for hospice criteria at that time. According to EP his symptoms were most likely secondary to intrinsic conduction disease.  After the hospitalization, patient was evaluated by cardiology, patient was noticed to have 30+ second run of bradycardia in 20s. Cardiology was also contacted, patient was started on low-dose dopamine infusion with as needed atropine last night.  This morning patient continues to be on dopamine infusion and maintaining his heart rate mostly in 40s to 50s with some readings even in 70s.  Paroxysmal atrial fibrillation: was noticed to be in PAF during last hospitalization and was also going in sinus pauses when flipping into sinus rhythm.  History of essential hypertension: During his last hospitalization due to patient presenting with acute kidney injury from dehydration and given his borderline blood pressure his lisinopril and hydrochlorothiazide was stopped.    --Continue to monitor patient closely and CICU on telemetry.  --Patient was seen twice, long conversation was held with patient and family in the presence of cardiology and then without cardiology regarding plan and goals of care.  --Patient and family is opting for patient getting coronary angiogram and if his blockages amenable for angioplasty and pacemaker placement at the same time they would like to proceed with it.  --It has been discussed with  family that there is risk for multivessel disease which might not be treatable with angioplasty and usually requires bypass surgery which patient will not qualify considering his co morbidities and age, they showed understanding that in that case patient will benefit from comfort care approach as he will probably qualify for hospice now considering new onset cardiomyopathy, severe pulmonary hypertension and severe bradycardia which is requiring dopamine infusion.  --After discussion, at this time it seems like family is more open to the idea of diagnostic angiogram with possible therapeutic intervention if possible and then will revisit further goals of care depending upon angiogram results.  --Highly appreciate cardiology help in the care of this nice patient with complicated issues.  --Patient will be maintained on aspirin 325 mg p.o. daily.  --Patient is receiving simvastatin 20 mg p.o. daily.  --Patient is not getting any beta-blocker due to ongoing bradycardia.  --Continue patient on dopamine 2 to 5 mcg/kg/min infusion to keep heart rate greater than 40.    --Continue patient on heparin infusion  --If patient is starts to become unstable with hypotension then will probably benefit from atropine administration, orders are in place.  After discussion with cardiology, they are recommending to stop the fluids at this point.  --Patient is able to eat and drink today, will make patient n.p.o. after midnight.  --Echocardiogram results were reviewed and discussed with patient and family.  -- awaiting CT results .  --As per cardiology discussion, patient is also open to the idea for changing his CODE STATUS to full code for the purpose of angiogram on Monday.  --Considering complicated situation, will also post palliative team to have further discussion with family tomorrow to have further discussion regarding goals of care, as per my discussion it seems like family would like to proceed with angiogram tomorrow once  patient is also evaluated by EP.     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  Used to be in an assisted living facility, his wife did not like it and has subsequently transition him to home with his son in October 2019.  During his last hospitalization, initially patient was quite impulsive, needed sitter by bedside.  He was also evaluated by PT and OT who recommended home with 24-hour supervision.  There were also long discussion with patient family regarding hospice and comfort care.  Family was open to meeting hospice and met with hospice team but patient did not meet criteria for hospice.  And patient was discharged home with family.  Home RN and home  for home safety evaluation was ordered.  -- fall precautions , if needed then will order sitter   --Seroquel 12.5 to 25 mg every 8 hours has been ordered for as needed agitation.  --We will also go ahead and order Senokot 25 mg routine at bedtime.  --Patient will require bedtime sitter in the evening.  --Discussed with family if they would like to accompany patient at the nighttime.     Prostate cancer (H) (10/6/2011): no ongoing active issues     Diet: Regular Diet Adult    Ventura Catheter: not present  DVT Prophylaxis: heparin infusion  Code Status: DNR/DNI    The patient's care was discussed with the Bedside Nurse, Patient and Cardiology Consultant along with family    Disposition Plan   Expected discharge: Expecting patient to stay here for couple of more nights, patient will also be evaluated by PT/OT, more than 45 minutes of the time was a spent this morning, patient was seen twice.   are posted, if patient undergoes angioplasty and pacemaker placement, family is hoping for TCU discharge right after the procedure.    Chen Gagnon MD, FACP  Text Page (7am -  6pm)    ----------------------------------------------------------------------------------------------------------------------    Interval History   Patient was seen and examined this morning, remains on dopamine infusion, atropine available at bedside, daughter present at the bedside initially, patient was also evaluated by cardiology, patient is feeling tired denying any chest pain .  Patient was again seen later in the presence of daughter, wife and son, cardiology has undergone different options in detail, at this time family is hoping that patient can proceed with angiogram tomorrow after ED evaluation if patient will benefit from pacemaker and angioplasty.  Discussed with them regarding concern for presence of multivessel disease and as patient will not qualify for bypass surgery as above.    -Data reviewed today: I reviewed all new labs and imaging results over the last 24 hours.    I personally reviewed the telemetry showing sinus bradycardia in 40sto 50s       Physical Exam   Temp: 97.9  F (36.6  C) Temp src: Oral BP: (!) 78/44 Pulse: 53 Heart Rate: 55 Resp: 14 SpO2: 97 % O2 Device: Nasal cannula Oxygen Delivery: 3 LPM  Vitals:    02/02/20 0654   Weight: 52.8 kg (116 lb 6.5 oz)     Vital Signs with Ranges  Temp:  [97.5  F (36.4  C)-98.1  F (36.7  C)] 97.9  F (36.6  C)  Pulse:  [53-84] 53  Heart Rate:  [45-83] 55  Resp:  [12-37] 14  BP: ()/(44-76) 78/44  SpO2:  [91 %-98 %] 97 %  I/O last 3 completed shifts:  In: 1630 [P.O.:600; I.V.:1030]  Out: 150 [Urine:150]    GENERAL: Alert , awake and oriented to person only , thin elderly and pleasant gentleman, lying in bed, family present at the bedside  NAD. Conversational, appropriate.   HEENT: Normocephalic. EOMI. No icterus or injection. Nares normal.   LUNGS: Clear to auscultation. No dyspnea at rest.   HEART: gonzalo, Extremities perfused.   ABDOMEN: Soft, nontender, and nondistended. Positive bowel sounds.   EXTREMITIES: No LE edema noted.   NEUROLOGIC:  Moves extremities x4 on command. No acute focal neurologic abnormalities noted.     Medications     - MEDICATION INSTRUCTIONS -       DOPamine 5 mcg/kg/min (20 1018)     HEParin 700 Units/hr (20 0830)     - MEDICATION INSTRUCTIONS -       - MEDICATION INSTRUCTIONS -       ACE/ARB/ARNI NOT PRESCRIBED       sodium chloride Stopped (20 1024)       aspirin  325 mg Oral Daily     influenza Vac Split High-Dose  0.5 mL Intramuscular Prior to discharge     senna-docusate  1 tablet Oral BID    Or     senna-docusate  2 tablet Oral BID     simvastatin  20 mg Oral QPM     sodium chloride (PF)  3 mL Intracatheter Q8H       Data   Recent Labs   Lab 20  0553 20  1436 20  0855 20  0428 20  1203   WBC 7.1  --  8.1 8.9 5.4   HGB 12.0*  --  11.9* 12.6* 12.0*   MCV 95  --  94 95 95     --  203 210 182     --   --  142 139   POTASSIUM 4.7  --   --  4.4 4.6   CHLORIDE 113*  --   --  111* 108   CO2 27  --   --  28 29   BUN 15  --   --  14 14   CR 0.95  --   --  1.04 1.05   ANIONGAP 2*  --   --  3 2*   ASHISH 8.2*  --   --  8.7 8.5   *  --   --  112* 91   ALBUMIN  --   --   --   --  3.1*   PROTTOTAL  --   --   --   --  6.2*   BILITOTAL  --   --   --   --  0.6   ALKPHOS  --   --   --   --  101   ALT  --   --   --   --  30   AST  --   --   --   --  24   TROPI  --  5.425* 6.904* 7.889* <0.015       Imaging:   Recent Results (from the past 24 hour(s))   Echocardiogram Limited    Narrative    [id number]  [id number]  [id number]  [id number]^KAROL^ROB           [facility]  Echocardiography Laboratory  [address] [place] [address]        Name: GINNY GONZALEZ  MRN: [id number]  : 1928  Study Date: 2020 02:08 PM  Age: 89 yrs  Gender: Male  Patient Location: [facility]  Reason For Study: Acute Myocardial Infarction  Ordering Physician: ROB ROBERSON  Referring Physician: Deuce Foy  Performed By: Colleen Alicea     BSA: 1.6 m2  Height: 66  in  Weight: 111 lb  HR: 71  BP: 119/65 mmHg  _____________________________________________________________________________  __     _____________________________________________________________________________  __        Interpretation Summary     Left ventricular systolic function is moderately reduced.  The visual ejection fraction is estimated at 30-35%.  Base-mid inferior and inferoseptal akinesis. Mid anterolateral, anteroseptal  and apical akinesis. No evidence of LV thrombus.  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  The right ventricle is normal in structure, function and size.  Severe (>55mmHg) pulmonary hypertension is present.  There is mod-severe to severe (3-4+) aortic regurgitation.  Severe TR.  The aortic root is not well visualized.  There is no pericardial effusion.     Compared to study dated 1/18/2020, there is significant drop in LV function  with above mentioned multisegment wall motion abnormalities concerning for  ischemia versus stress cardiomyopathy. There is now severe pulmonary  hypertension, as well as moderate-severe AI. The aortic root was not well  visualized. Given acute worsening in AI and prior known aortic aneurysm, would  consider CT angiogram chest and coronary evaluation.  _____________________________________________________________________________  __        Left Ventricle  The left ventricle is moderately dilated. There is normal left ventricular  wall thickness. The visual ejection fraction is estimated at 30-35%. Left  ventricular systolic function is moderately reduced. Base-mid inferior and  inferoseptal akinesis. Mid anterolateral, anteroseptal and apical akinesis. No  evidence of LV thrombus. 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     Right Ventricle  The right ventricle is normal in structure, function and size.     Atria  The left atrium is moderately dilated. Right atrial size is  normal.     Mitral Valve  The mitral valve is normal in structure and function. There is trace mitral  regurgitation.        Tricuspid Valve  Normal tricuspid valve. The right ventricular systolic pressure is  approximated at 80.3 mmHg plus the right atrial pressure. Severe (>55mmHg)  pulmonary hypertension is present. There is severe (4+) tricuspid  regurgitation.     Aortic Valve  The aortic valve is trileaflet. There is mod-severe to severe (3-4+) aortic  regurgitation.     Pulmonic Valve  The pulmonic valve is normal in structure and function.     Vessels  The aortic root is not well visualized. Dilation of the inferior vena cava is  present with abnormal respiratory variation in diameter. IVC diameter >2.1 cm  collapsing <50% with sniff suggests a high RA pressure estimated at 15 mmHg or  greater.     Pericardium  There is no pericardial effusion.     _____________________________________________________________________________  __  MMode/2D Measurements & Calculations  IVSd: 1.0 cm  LVIDd: 4.3 cm  LVIDs: 3.1 cm  LVPWd: 1.1 cm  FS: 26.9 %  LV mass(C)d: 156.7 grams  LV mass(C)dI: 100.6 grams/m2     RWT: 0.53        Doppler Measurements & Calculations  AI P1/2t: 271.5 msec  TR max alexandra: 402.7 cm/sec  TR max P.3 mmHg           _____________________________________________________________________________  __           Report approved by: Hung Allen 2020 03:06 PM

## 2020-02-02 NOTE — PROGRESS NOTES
I was notified that patient had a 30+ second run of bradycardia to 20s. He is now back into the 50s-60s with stable BP. He was symptomatic with this decrease in HR. Family as of this morning were declining pacer and have declined it in the past. They are at the hospital currently but were out of the room when I spoke with nurse who attempted to speak with them. They will have another conversation regarding pacer when they return to the room. If patient continues to be bradycardic with DNR/DNI, we may need to focus more on comfort if no pacer will be placed.     In the interim, we will trial low dose dopamine infusion with atropine PRN to see if we can stabilize him with a less invasive approach. I would not recommend externally pacing the patient if family is against pacemaker. If family decides upon pacemaker, we can contact Dr. Yang for consideration of temporary pacer wire placement.     Francisco Javier Ridley MD

## 2020-02-02 NOTE — PLAN OF CARE
PT/OT/CR: Order received and chart reviewed. Per discussion with RN, appropriate to hold therapy at this time. Pt is doing some mobility with RN team, and family is deciding further medical goals and plan of care, considering PPM or comfort cares. Will allow for further medical management and establishment of goals prior to initiating therapy.

## 2020-02-02 NOTE — PLAN OF CARE
Tele- SR besides episode of severe symptomatic bradycardia. Pt became cyanotic and had increase of SOB. Pt recovered back to SR in the 70s on his own after about two minutes. On call cardiologist aware, planning to transfer to CCU and start Dopamine. On 3L NC. Pt only alert to self. Agitated, confused and pulling at lines. Sitter at bedside. PRN Seroquel helping. Heparin gtt infusing, recheck in AM. CT of chest this evening. Family at bedside. Cardiologist informed family that options might be either PPM or comfort cares, family in discussion. Continue to monitor.    no

## 2020-02-02 NOTE — PROGRESS NOTES
Cautiously increase seroquel given continued mild agitation, zyprexa IM if unable to take oral and danger to self.   Already has sitter, qtc ok

## 2020-02-02 NOTE — PLAN OF CARE
[id number]: A&Ox1-2. Cooperative. Can be agitated at times. Picks at skin, IVs, etc. Bedside attendant in place. VSS on 3L NC. Tele SR 60-80s, has been SB into 20s during evening shift. Heparin drip @ 7. Dopamine drip @ 2.5. Atropine available if need be. Plan to discuss pacemaker option, if not then consider palliative.

## 2020-02-03 NOTE — PROVIDER NOTIFICATION
MD Notification    Notified Person: MD    Notified Person Name: Dr. Yang    Notification Date/Time: 2/2 2200    Notification Interaction: paged    Purpose of Notification: patient tachy/gonzalo and pressures 70/40s post temporary pacemaker.     Orders Received: please call on call Cardiology.     Comments:

## 2020-02-03 NOTE — PLAN OF CARE
PT/OT: Per discussion with RN, pt/family pursuing comfort cares/hospice. Will complete therapy orders at this time.

## 2020-02-03 NOTE — PROVIDER NOTIFICATION
MD Notification    Notified Person: MD    Notified Person Name: Dr. Yang     Notification Date/Time: 2/2 2200    Notification Interaction: Web based page     Purpose of Notification: Patient tachy/gonzalo and pressure in the 70s post temp pacemaker     Orders Received:    Comments:

## 2020-02-03 NOTE — PLAN OF CARE
Tele: SR with dopamine at 2.5 mcg/kg/min. Pt became hypotensive and bradycardic, dopamine increased to 5 mcg/kg/min. Pt responded with HRs 70-80 SR and -120's/70's. Palliative was consulted with hopes pt could make it to Monday for a possible cath and PPM. HR's dropped to 40-50's and SBP dropped 70's. Cardiology notified and wanted to continue to monitor. House NP was asked to come see patient and speak with cardiology incase of emergency during the night. At this point dopamine was increased to 10mcg/kg/min. Pts HR dropped to 20's around 1850, symptomatic, sats dropped.  0.5mg atropine given and RRT called. Family was called in to make medical decisions. Pt left for cath lab for temporary pace maker 2025. Report given to cath lab nurse.

## 2020-02-03 NOTE — PROVIDER NOTIFICATION
MD Notification    Notified Person: MD    Notified Person Name: Dr. Ridley    Notification Date/Time: 2/2 2200    Notification Interaction: paged    Purpose of Notification: Patient tachy/gonzalo and pressures 70/40s with temporary pacemaker.     Orders Received: will transfer patient to ICU for more pressure support.     Comments:

## 2020-02-03 NOTE — PROGRESS NOTES
House [facility] brief note:    Was paged by nursing as family has elected to proceed with comfort care after discussion with cardiologist and intensivist present at pt's bedside in ICU.  Intensivist has requested that I order comfort measures for pt.  After my discussion with intensivist, end of life orders have been placed.  Palliative care to see pt in AM to help with further transition of care.      SARAH Najera, CNP  House [facility]    No charge.

## 2020-02-03 NOTE — CONSULTS
[facility]  Palliative Care Consultation Note    Patient: Nahun Han  Date of Admission:  2/1/2020    Requesting Clinician / Team: Dr. Amin/Hospitalist  Reason for consult: Pain management, Symptom management, Patient and family support    Recommendations:    Continue comfort measures only    Transferring to station 88 from ICU today     SW consult for hospice. Discussed home vs facility placement, NH vs hospice home. Financial implications of facility placement reviewed. SW to further discuss with family, and obtain hospice agency choice. Pt is a  and may have service connection, which may make him eligible for nursing home placement     Continue seroquel 25mg PO at bedtime. May have difficulty swallowing this at some point, and can transition to oral haldol or zyprexa PRN    Increase morphine 10-20mg SL every 2hrs PRN pain, SOB. IV available PRN for sx not controlled by SL     Haldol 1-2mg IV Q1hr PRN or haldol 1-2mg SL Every 6hrs PRN agitation     Zyprexa 2.5-5mg ODT every 6hrs PRN agitation     Ativan 1-2mg IV/SL every 3hrs PRN anxiety, agitation     Atropine, robinul PRN secretions     These recommendations have been discussed with ICU and station 88 nursing staff.    Chanel SMITH, [facility]  Palliative Medicine   Pager [phone]      Thank you for the opportunity to participate in the care of this patient and family. Our team: will continue to follow.     During regular M-F work hours -- if you are not sure who specifically to contact -- please contact us at [phone].    After regular work hours and on weekends/holidays, you can call our answering service at [phone]. Also, who's on call for us is available in [employer].     Attestation:  Total time on the floor involved in the patient's care: 70 minutes  Total time spent in counseling/care coordination: >50%    Assessments:  Nahun Han is a 89 year old male with PMH significant for HTN, distant hx  "prostate CA s/p prostatectomy, advanced alzheimer's dementia, and  paroxysmal a.fib who presents with SOB and chest pain. He was found to have an elevated troponin concerning  For NSTEMI and symptomatic bradycardia. Echo was obtained and demonstrated EF 30-35%. Pt was evaluated by cardiology and family elected to transition to comfort measures.     Today, the patient was seen for:  Pain and symptom management, pt and family support    Palliative  notified me that pt is restless, and family voicing concern for pt's restlessness. I spoke with bedside RN who reports that pt is indeed restless.    Visited pt, along with pt's wife, daughter, and son. Pt appearing more calm and restful during my visit.     Spoke at length with family regarding pt's recent hospitalization, further decline in the last 2 weeks, and now this hospitalization. They feel at peace with the decision to prioritize comfort.    We speak about managing symptoms with opioids, antipsychotics, anxiolytics. I think he is going to be on the sleepy side. They describe a possible rally yesterday, for which they are grateful.     We talk about next steps, including transfer to station 88 and involvement of SW for hospice planning.     Educated family regarding hospice philosophy and prognostic criteria. Dispelled common myths. Discussed what hospice is (and is not), what services are usually provided (and those that are not, ex \"penitentiary care\"), under what circumstances people tend to enroll, and the variety of places people can get hospice care (along with subsequent financial implications). Discussed typical anticipated timing of discharge. Based on this discussion, family is willing to proceed with this planning.     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 Talked about self care in this journey. Family wondering about prognosis. I share with them that time is likely measured in " days to weeks at this point. If his condition changes to suggest a quicker decline, and more imminent dying process, we will update them.     Prognosis, Goals, & Planning:      Functional Status just prior to hospitalization: 4 (Completely disabled and dependent on others for selfcare; bedbound)      Prognosis, Goals, and/or Advance Care Planning were addressed today: Yes      Patient's decision making preferences: unable to assess          Patient has decision-making capacity today for complex decisions: No            I have concerns about the patient/family's health literacy today: No           Patient has a completed Health Care Directive: Yes, and on file.      Code status: DNR/DNI    Coping, Meaning, & Spirituality:   Mood, coping, and/or meaning in the context of serious illness were addressed today: Yes  Summary/Comments: Family tearful and sad as they acknowledge that pt is dying, practicing using that language and preparing for the steps ahead. Pt is Taoist, palliative  following, pt was anointed. Family trying to determine how to manage pt's dying process, while life is happening outside these walls.     Social:     Living situation: With wife     Key family / caregivers: Wife, daughter, son involved and supportive     History of Present Illness:  History gathered today from: family/loved ones, medical chart, medical team members, health care directive/s    Nahun Han is a 89 year old male with PMH significant for HTN, distant hx prostate CA s/p prostatectomy, advanced alzheimer's dementia, and  paroxysmal a.fib who presents with SOB and chest pain. He was found to have an elevated troponin concerning  For NSTEMI and symptomatic bradycardia. Echo was obtained and demonstrated EF 30-35%. Pt was evaluated by cardiology and family elected to transition to comfort measures.     Key Palliative Symptom Data:  # Pain severity the last 12 hours: low  # Dyspnea severity the last 12 hours: low  #  Nausea severity the last 12 hours: none  # Anxiety severity the last 12 hours: moderate    Patient is on opioids: assessed and I made recommendations about bowel care as above.    ROS:  Comprehensive ROS is reviewed and is negative except as here & per HPI: N/A     Past Medical History:  Past Medical History:   Diagnosis Date     Chronic renal insufficiency     hx kidney stones     Dementia (H)      Hypertension      Paroxysmal atrial fibrillation (H)      Sinus node dysfunction (H)         Past Surgical History:  Past Surgical History:   Procedure Laterality Date     GENITOURINARY SURGERY      kidney stone procedures     HERNIORRHAPHY INGUINAL  7/28/2014    Procedure: HERNIORRHAPHY INGUINAL;  Surgeon: Augie Block MD;  Location: [facility]     PROSTATE SURGERY  1993         Family History:  No family history on file.      Allergies:  No Known Allergies     Medications:  I have reviewed this patient's medication profile and medications from this hospitalization.   Noted scheduled meds are:  Seroquel 25mg PO at bedtime   Senna    Noted PRN meds are:  Morphine 10-20mg SL every 2hrs PRN pain, SOB. IV available PRN for sx not controlled by SL   Haldol 1-2mg IV Q1hr PRN or haldol 1-2mg SL Every 6hrs PRN agitation   Zyprexa 2.5-5mg ODT every 6hrs PRN agitation   Ativan 1-2mg IV/SL every 3hrs PRN anxiety, agitation   Atropine, robinul PRN secretions     Physical Exam:  Vital Signs: Temp: 98.7  F (37.1  C) Temp src: Oral BP: 90/48 Pulse: 61 Heart Rate: (!) 44 Resp: 16 SpO2: (!) 87 % O2 Device: None (Room air) Oxygen Delivery: 5 LPM  Weight: 116 lbs 6.45 oz  CONSTITUTIONAL: Chronically ill elderly man seen lying in ICU bed in mild distress at times, but generally calm, comfortable, restful. Family present. Sitter at bedside     Data reviewed:  Recent imaging reviewed, my comments on pertinents:   1/30 CT Head negative   1/30 CXR negative   2/1 Echo with EF 30-35%    Recent lab data reviewed, my comments on pertinents:   Na  142  K 4.7  Creat 0.95  WBC 7.1  Hgb 12  Plt 194

## 2020-02-03 NOTE — PRE-PROCEDURE
Pre-procedural Note:      This patient has dismal life expectancy.  He suffers from cardiogenic shock with severe AR, TR, Pulmonary hyertension, and intermittent bradycardia.  I have been asked to place pacemaker as the care providers believe it is contributing to his hypotension.  However I am concerned there is no ultimate treatment for this patients multiple cardiac issues and underlying cause of his cardiogenic shock.  Patient has no evidence of ongoing ischemia with unremarkable EKG, decreasing troponins.    Procedure was requested on an emergent basis.

## 2020-02-03 NOTE — PLAN OF CARE
Patient arrived to ICU around 2300. Temporary pacemaker stopped capturing upon arrival. Levophed started. Family decided to go comfort care. Plan for social work consult to discuss hospice options.

## 2020-02-03 NOTE — PROGRESS NOTES
Neuro: disoriented to situation. Was calm and lucid most of the day. Started to become agitated around 2200. Does pick at lines and equipment.     Cardiac:   -before cath lab pt was SB/JR in the 50s and would gonzalo down to the 20s.  One dose of 0.5mg atropine given on previous shift. Symptomatic with pressures 70/40s. Pressure not responding to dopamine.  -after cath lab pt was V paced at 70 rate/10 output. At 2140 patient went tachy 115s. Vitals stable. Lasting for 3 minutes then returned to V pacing at 70. At 2212, patient failed to capture and heart rate went into the 40s. Blood pressures were 75/46. Was able to gain capture again. After speaking with Dr. Ridley, pacemaker rate decreased to 50. Patient paced 50% of the time and other 50% was junctional rhythm at 52. However blood pressure decreased to 66/40. Patient placed in trendelenburg and pacemaker rate changed to 70. Blood pressures improved to 73/48. Cardiology notified.    Respiratory: oxymask 5 liters, lungs diminished with exp. wheezes    Drips: heparin @ 700, dopamine @ 10 mcg/kg/min    Drains/Tubes: femoral sheath in right groin - CDI, CMS intact. Right PT pulse dopplerable. Unable to doppler DP pulse.     GI/: incontinent of urine at times.     Activity: bedrest due to femoral sheath    Transferred to ICU at 2245 for more blood pressure support. Report called to ANNE-MARIE Kingsley.

## 2020-02-03 NOTE — CODE/RAPID RESPONSE
"[facility]    House [facility] RRT Note  2/2/2020   Time Called: 1851    RRT called for: Symptomatic bradycardia    Assessment & Plan     Symptomatic bradycardia.  - Upon arrival, pt lying in bed at 60 degree angle, awake, alert, in no obvious distress; however, while present at bedside, pt's HR would drop into the 40s with pt noting \"gasping for air\" with noted low O2 sats to the 70s during episodes.  Pt's BPs subsequently hypotensive and has since remained hypotensive, SBP 70 with HR no longer rebounding back to the 80s or 90s, has remained 50s despite dopamine at 10 mcg/kg/min.     Received bedside handoff from cardiology Dr. Ridley and also received handoff from colleague Catie Medrano, [place] [facility].  Please defer to her note for further indepth conversation.  Pt's daughter, Radha, arrived while present on unit.  Re-discussed current possible options including focusing on pt's comfort solely, providing morphine at times of air hunger associated with symptomatic bradycardia and allow for natural dying process.  Also discussed both transvenous temporary pacemaker and angiogram/angioplasty as previously discussed in depth by cardiologist Dr. Mariscal.  After family conference, family and pt solely, family has agreed to proceed with both interventions,  transvenous temporary pacemaker and angiogram/angioplasty if deemed appropriate by interventional cardiology.  Dr. Ridley had updated Dr. Yang, interventional cardiology this evening regarding pt's clinical presentation.  Re-contacted Dr. Yang regarding pt's family decision to proceed with both interventions, and Dr. Yang has elected to proceed solely with placement of transvenous temporary pacemaker at this time.      INTERVENTIONS:  - Will activate cath lab for placement of transvenous temporary pacemaker  - Will defer additional vasopressor therapy for noted hypotension prior to transporting to cath lab as concern for worsening vasoconstriction and " demand on pt's heart.  If placement of temporary pacemaker does not correct hypotension, may need to transfer pt to ICU at that time for initiation of norepinephrine.      At the end of the RRT pt transported to cath lab for placement of transvenous temporary pacemaker.    Discussed with and defer further cares to nursing, Dr. Ridley, cardiology and Dr. Yang, interventional cardiologist.  Curbside update to intensivist should pt require transfer to ICU post cath lab.      Interval History     Nahun Han is a 89 year old male who was admitted on 2/1/2020 for SOB and chest discomfort.    Medical history significant for: essential hypertension, distant history of prostate cancer with prostatectomy, Alzheimer's dementia, paroxysmal atrial fibrillation    Code Status: DNR/DNI    Allergies   No Known Allergies    Physical Exam   Vital Signs with Ranges:  Temp:  [97.9  F (36.6  C)-98.9  F (37.2  C)] 98.9  F (37.2  C)  Pulse:  [38-86] 86  Heart Rate:  [41-86] 86  Resp:  [12-37] 15  BP: ()/(42-79) 124/69  SpO2:  [91 %-100 %] 100 %  I/O last 3 completed shifts:  In: 1875 [P.O.:600; I.V.:1275]  Out: 150 [Urine:150]    Constitutional: Pt lying in bed, awake, alert, in mild respiratory distress  Pulmonary: In mild respiratory distress, minimal use of chest/abdominal muscles  Cardiovascular: Warm  GI: Not assessed  Skin/Integumen: Warm, ashen  Neuro: Awake, alert  Psych:  Calm  Extremities: Moving all extremities     Time Spent on this Encounter   I spent 60 minutes of critical care time on the unit/floor managing the care of Nahun Han. Upon evaluation, this patient had a high probability of imminent or life-threatening deterioration due to symptomatic bradycardia, which required my direct attention, intervention, and personal management. 100% of my time was spent at the bedside counseling the patient and/or coordinating care regarding services listed in this note.    SARAH Leon [facility]

## 2020-02-03 NOTE — PROGRESS NOTES
SPIRITUAL HEALTH SERVICES Progress Note  [facility] ICU    Met with pt's family, including wife, daughter and son, who were gathered around pt's bed.  Pt was initially resting apparently peacefully when I arrived, but pt's family said that he has at times been restless.  They said that they made the decision last night to elect comfort care, and are now waiting for pt to transfer out of ICU.  They are pleased that pt was anointed last evening by Fr. Mendoza.  They requested prayer, which I provided.  During prayer pt became more agitated, kicked off his blanket, and began to pull himself up.  Pt had an aide in the room, who came to the bedside to assist.   team available if pt/family request additional spiritual/emotional support.                                                                                                                                                 Kenisha Manzano M.A.  Staff   Pager [phone]  Phone [phone]

## 2020-02-03 NOTE — CONSULTS
ICU NOTE    Ms. Han is a 89 yo M with history of progressive Alzheimer dementia, HTN, PAF, CKD, prostate cancer. Presents to ED complaining of shortness of breath, chest tightness and generalized fatigue on 2/1/2020.   Patient was bradycardic, normotensive.  Echocardiogram showed new severely reduced LV function, basal to mid inferior, inferoseptal akinesis and mid anterolateral anteroseptal and apical akinesis, severe aortic regurgitation.   Chest CT scan showed ascending thoracic aorta measures up to 4 cm.   Cardiology service was consulted, it was thought that patient has a recent acute coronary syndrome. It was initially thought to purse with a coronary angiogram on Monday but further discussion needed to be done with family in view of patient's significant co-morbidities.   Patient become bradycardic and hypotensive, started on dopamine.   EP service was contacted, underwent temporary pacemaker placement.   Patient was admitted to ICU, persistent hypotension , started on NE in addition to dopamine.   ICU service consulted.     During my initial visit, cardiologist showed up.  Update was provided.   Goals of care were discussed with family.   Family would like to change acute treatment to comfort measures in view of significant comorbidities.     Will acknowledge family wishes.     Hospialist team was informed.     ICU team will sign off.     Franklyn Mohamud  Pulmonary Critical Care   1/2/2020 11:55

## 2020-02-04 NOTE — PLAN OF CARE
Patient oriented to self at times.  Will answer to name and will answer yes or no to pain questions.  Upon arrival respiration labored and c/o chest pain prn IV morphine given.  Q2h roxanol given x 3 with good pain relief.  No po intake this shift.  Did void x 1.  Will have 25 second periods of apnea, hands and feet cool to touch.  Appears to rest comfortably at this time.  Sitter at bedside.

## 2020-02-04 NOTE — PLAN OF CARE
Pt calm and cooperative overnight, will answer yes/no questions. Full assessment deferred d/t comfort cares. Appeared comfortable overnight. Periods of apnea. C/o chest and generalized pain managed well with PRN Roxanol q2h. Repositioning as pt allows. Incontinent. Regular diet, not alert enough for PO intake. PIV SL x2.

## 2020-02-04 NOTE — PROGRESS NOTES
[facility]    Medicine Progress Note - Hospitalist Service       Date of Admission:  2/1/2020  Assessment & Plan   Nahun Han is a 89 year old male with past medical history significant for essential hypertension, distant history of prostate cancer with prostatectomy, Alzheimer's dementia, paroxysmal atrial fibrillation which was noticed during his last hospitalization currently not taking any home medications other than Flonase who was recently admitted to the hospital from January 17 to January 20 for symptomatic sinus pauses, bradycardia and S-A node dysfunction is admitted from emergency room this morning when he presented with shortness of breath and some chest discomfort and was found to have significantly elevated troponin concerning for possible non-STEMI and symptomatic bradycardia with decision made to transition to comfort care.    NSTEMI (non-ST elevated myocardial infarction) (H) (2/1/2020):   Initially on admission it was not clear if patient has non-STEMI versus type II non-STEMI from supply demand mismatch in the picture of bradycardia. Echocardiogram done during last hospitalization showed mild aortic root dilatation with estimated ejection fraction around 55 to 60% with mild TR and mild AR without any wall motion abnormalities. Echo done during this admission showed significant change with drop in ejection fraction to 30-35%, there is multisegment wall motion abnormalities concerning for ischemia versus a stress cardiomyopathy with severe pulmonary hypertension which is also new as well as moderate to severe aortic insufficiency.  Serial troponins peaked at 7.889.  Cardiology saw the patient and after discussion with the family it was decided to change to comfort cares on 2/2 overnight.  -Much improved, alert and awake today, still poor prognosis with large MI and systolic heart failure  -Continue comfort care  -Palliative Care and Social work consults appreciated for  consideration of transfer to hospice facility versus transfer home with home hospice and 24/7  through an agency (family wants to compare and make a decision)  -Continue morphine and ativan PRN for comfort  -continue Seroquel 25 mg at bed time and 12.5 to 25 mg every 8 hours as needed agitation as patient allows  -can use haldol or zyprexa if not able to take seroquel pills    Severe symptomatic bradycardia:   Paroxysmal atrial fibrillation:  Patient was recently admitted 2 weeks ago, at that time patient was evaluated in detail by EP and there was concern for patient post operative delirium due to patient being impulsive due to his underlying severe dementia and at risk for pulling the wires and not able to follow-up postoperative instructions well.  During his last hospitalization patient was also evaluated by palliative team but he did not qualify for hospice criteria at that time. According to EP his symptoms were most likely secondary to intrinsic conduction disease. After the hospitalization, patient was evaluated by cardiology, patient was noticed to have 30+ second run of bradycardia in 20s. Patient was started on dopamine and then transition to pressors due to low blood pressures.  After discussion with family patient was made comfort cares.  -continue comfort cares    History of essential hypertension: During his last hospitalization due to patient presenting with acute kidney injury from dehydration and given his borderline blood pressure his lisinopril and hydrochlorothiazide were stopped.   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  Used to be in an assisted living facility, his wife did not like it and has subsequently transition him to home with his son in October 2019.  During his last hospitalization, initially patient was quite impulsive, needed sitter by bedside.  He was also  evaluated by PT and OT who recommended home with 24-hour supervision. There were also long discussion with patient family regarding hospice and comfort care.  Family was open to meeting hospice and met with hospice team but patient did not meet criteria for hospice at that time. Patient was discharged home with family.  Home RN and home  for home safety evaluation was ordered.  --continue comfort cares     Prostate cancer (H) (10/6/2011): no ongoing active issues      Diet: Regular Diet Adult    DVT Prophylaxis: Not indicated, comfort care  Ventura Catheter: not present  Code Status: DNR/DNI, comfort care    Disposition Plan   Expected discharge: Tomorrow, recommended to hospice once arrangements made for residential hospice or home hospice with 24/7 assistant.  Entered: Jose Manuel Amin MD 02/04/2020, 10:16 AM     The patient's care was discussed with the Bedside Nurse, Care Coordinator/, Patient and Patient's Family.    Jose Manuel Amin MD  Hospitalist Service  [facility]  ______________________________________________________________________    Interval History   Oriented to person only which limited history. No chest pain or shortness of breath. Woke up and eating breakfast this morning. Daughter is with patient at bedside.  Discussed poor prognosis still with heart attack and heart failure but that we have some time to enjoy things and focus on comfort.  Daughter interested in options for hospice and discussing with family but would need 24/7 support at home.    Data reviewed today: I reviewed all medications, new labs and imaging results over the last 24 hours. I personally reviewed no images or EKG's today.    Physical Exam   Vital Signs:         Heart Rate: (!) 44 Resp: 14       Weight: 116 lbs 6.45 oz  Constitutional: Awake, alert, cooperative, no apparent distress, oriented to person only  Respiratory: Clear to auscultation bilaterally, no crackles or  wheezing  Cardiovascular: Bradycardic, normal S1 and S2, and no murmur noted  GI: Normal bowel sounds, soft, non-distended, non-tender  Skin/Integumen: No rashes, no cyanosis, no edema  Other:      Data   Recent Labs   Lab 02/02/20  0553 02/01/20  1436 02/01/20  0855 02/01/20  0428 01/30/20  1203   WBC 7.1  --  8.1 8.9 5.4   HGB 12.0*  --  11.9* 12.6* 12.0*   MCV 95  --  94 95 95     --  203 210 182     --   --  142 139   POTASSIUM 4.7  --   --  4.4 4.6   CHLORIDE 113*  --   --  111* 108   CO2 27  --   --  28 29   BUN 15  --   --  14 14   CR 0.95  --   --  1.04 1.05   ANIONGAP 2*  --   --  3 2*   ASHISH 8.2*  --   --  8.7 8.5   *  --   --  112* 91   ALBUMIN  --   --   --   --  3.1*   PROTTOTAL  --   --   --   --  6.2*   BILITOTAL  --   --   --   --  0.6   ALKPHOS  --   --   --   --  101   ALT  --   --   --   --  30   AST  --   --   --   --  24   TROPI  --  5.425* 6.904* 7.889* <0.015     No results found for this or any previous visit (from the past 24 hour(s)).  Medications       QUEtiapine  25 mg Oral At Bedtime     senna-docusate  1 tablet Oral BID    Or     senna-docusate  2 tablet Oral BID     sodium chloride (PF)  3 mL Intracatheter Q8H

## 2020-02-04 NOTE — CONSULTS
SW Consult Note:    Care Transition Initial Assessment - SW     Met with: Patient and Family    Principal Problem:    NSTEMI (non-ST elevated myocardial infarction) (H)  Active Problems:    Symptomatic bradycardia    Prostate cancer (H)    Dementia (H)    Hypertension    Paroxysmal atrial fibrillation (H)    Sinus node dysfunction (H)    SOB (shortness of breath) on exertion    ACS (acute coronary syndrome) (H)    Bradycardia       DATA  Lives With: (P) spouse, child(jaspreet), adult   Quality of Family Relationships: (P) helpful, involved, supportive  Who is your support system?: (P) Wife, Children  Identified issues/concerns regarding health management: Discharge to hospice facility with [facility] care.    Quality of Family Relationships: (P) helpful, involved, supportive      ASSESSMENT  Cognitive Status:  awake, alert and oriented  Concerns to be addressed: Discharge Planning/Hospice    Patient is a 89 year old male that was admitted to the hospital on 2/1/20 with a primary diagnosis of NSTEMI.  SW reviewed chart and met with patient, spouse, son and daughter to introduce self, role and discussion of discharge planning/hospice.  SW was consulted to assist with hospice discharge planning.  Patient, spouse and son live in home in [place] and they would like to pursue options in the area.  SW provided information regarding hospice facilities, LTC facilities and private duty care in the home.  Family stated that they would like to look into hospice facilities and LTC facilities for hospice care.  Requesting referrals be sent to [facility], [facility] and [facility].  DWIGHT discussed private pay for room and board at facility and family is in understanding. Patient is a  and SW discussed looking into connected benefits for patient. DWIGHT placed call to [facility] with [facility] ([phone]) and patient is connected for facility coverage when enrolled in hospice.      DWIGHT received call from [facility]  and they would be able to accept patient at their facility.  SW provided family with a [facility] contracted facility list as well.  Will discuss bed availability with family for disposition decision.      PLAN  Financial costs for the patient includes: Discussed private pay for room and board at facilities and hospice houses, in addition to private duty care in the home being private pay.   Patient given options and choices for discharge: Yes, provided SNF list, hospice Facility list, hospice agency list.   Patient/family is agreeable to the plan?  Yes  Transportation/person available to transport on day of discharge: [facility]  Patient Goals and Preferences: Facility with [facility]  Patient anticipates discharging to: Facility with [facility]    REN Guerrero, LGSW  [phone]  [facility]

## 2020-02-05 NOTE — PROGRESS NOTES
"SPIRITUAL HEALTH SERVICES Progress Note  [facility]    Visit with pt's wife and son Silvano at pt's bedside.  I first met them in ICU on Monday, when they had elected comfort care for the pt.  They share that he had a good day yesterday, but today he appears to be actively dying.  They are concerned about pt's \"death rattle,\" but otherwise are accepting of the process as pt nears the end of his life.  They are pleased that pt was anointed by Fr. Mendoza, and [facility].  Provided supportive presence and conversation.   team available if needs arise.                                                                                                                                               Kenisha Manzano M.A.  Staff   Pager [phone]  Phone [phone]      "

## 2020-02-05 NOTE — PLAN OF CARE
Patient alert oriented to self and time today.  C/o pain twice prn roxanol given.  UP with gait belt and 1 gait unsteady.  Continent x 2 in bathroom.  Regular diet eating about 50% of meals today no swallowing difficulties.  Up in recliner today x 2.  Patient discharge to facility with [facility] once placement found.

## 2020-02-05 NOTE — PROVIDER NOTIFICATION
MD Notification    Notified Person: MD    Notified Person Name:Dr Collier    Notification Date/Time: 1000    Notification Interaction: text paged    Purpose of Notification: Just a FYI to Hospitalist  of status change - declining, pt  now obtunded and having Cheyne-Latif breathing pattern with progressing longer periods of apnea @approx. 30 seconds with 6-7 breaths. Both knees and upper extremities have mottling but feet remain warm.  Appears comfortable. Remains Comfort Care and DNR/DNI. Wife and adult son & daughter notified and aware of terminal status.     Orders Received:   Comments: Writer not expecting any except nursing  To continue to monitor - provide comfort measures as need. [facility] nurse met with wife this am. Pt kept on bedrest.      \

## 2020-02-05 NOTE — PLAN OF CARE
7a-3p shift report  Status change - declining, pt that is now obtunded and having Cheyne-Latif breathing pattern with progressing longer periods of apnea @approx. 30 seconds with 6-7 breaths.   Both knees and upper extremities have mottling but feet remain warm.  Appears comfortable. Remains Comfort Care and DNR/DNI. Family  aware that pt may be actively dying. Wife at bedside. Writer notified Hospitalist via text and spoke with daughter on phone who plans to visit in afternoon. Wife notified son.   Atropine given x1 for congestive coughing/oral secretions -writer set up suction with [place] in case oral secretions visibly accumulate. Continue to provide comfort measures as needed. [facility] nurse met with wife this am. Pt kept on bedrest. Assume pt to stay here but If condition stabilizes then [facility] is wife's first choice of further hospice care location.,

## 2020-02-05 NOTE — PLAN OF CARE
"VS deferred per comfort measures only. C/o \"not feeling well,\" Roxanol given x1 with relief. Minimal output noted. Plan for discharge with hospice pending placement.    "

## 2020-02-06 ASSESSMENT — ACTIVITIES OF DAILY LIVING (ADL): ADLS_ACUITY_SCORE: 20

## 2020-02-06 NOTE — DEATH PRONOUNCEMENT
MD DEATH PRONOUNCEMENT    Called to pronounce Nahun Han dead.    Physical Exam: Spontaneous respirations absent, Breath sounds absent, Carotid pulse absent and Heart sounds absent    Patient was pronounced dead at 08:35 PM, 2020.    Preliminary Cause of Death: NSTEMI with aortic insufficiency    Principal Problem:    NSTEMI (non-ST elevated myocardial infarction) (H)  Active Problems:    Symptomatic bradycardia    Prostate cancer (H)    Dementia (H)    Hypertension    Paroxysmal atrial fibrillation (H)    Sinus node dysfunction (H)    SOB (shortness of breath) on exertion    ACS (acute coronary syndrome) (H)    Bradycardia       Infectious disease present?: NO    Communicable disease present? (examples: HIV, chicken pox, TB, Ebola, CJD) :  NO    Multi-drug resistant organism present? (example: MRSA): NO    Please consider an autopsy if any of the following exist:  NO Unexpected or unexplained death during or following any dental, medical, or surgical diagnostic treatment procedures.   NO Death of mother at or up to seven days after delivery.     NO All  and pediatric deaths.     NO Death where the cause is sufficiently obscure to delay completion of the death certificate.   NO Deaths in which autopsy would confirm a suspected illness/condition that would affect surviving family members or recipients of transplanted organs.     The following deaths must be reported to the 's Office:  NO A death that may be due entirely or in part to any factors other than natural disease (recent surgery, recent trauma, suspected abuse/neglect).   NO A death that may be an accident, suicide, or homicide.     NO Any sudden, unexpected death in which there is no prior history of significant heart disease or any other condition associated with sudden death.   NO A death under suspicious, unusual, or unexpected circumstances.    NO Any death which is apparently due to natural causes but in which the   does not have a personal physician familiar with the patient s medical history, social, or environmental situation or the circumstances of the terminal event.   NO Any death apparently due to Sudden Infant Death Syndrome.     NO Deaths that occur during, in association with, or as consequences of a diagnostic, therapeutic, or anesthetic procedure.   NO Any death in which a fracture of a major bone has occurred within the past (6) six months.   NO A death of persons note seen by their physician within 120 days of demise.     NO Any death in which the  was an inmate of a public institution or was in the custody of Law Enforcement personnel.   NO  All unexpected deaths of children   NO Solid organ donors   NO Unidentified bodies   YES Deaths of persons whose bodies are to be cremated or otherwise disposed of so that the bodies will later be unavailable for examination;   NO Deaths unattended by a physician outside of a licensed healthcare facility or licensed residential hospice program   NO Deaths occurring within 24 hours of arrival to a health care facility if death is unexpected.    NO Deaths associated with the decedent s employment.   NO Deaths attributed to acts of terrorism.   NO Any death in which there is uncertainty as to whether it is a medical examiner s care should be discussed with the medical investigator.        Body disposition: Autopsy was discussed with family member:  Spouse in person.  Permission for autopsy was declined.    SARAH Barker CNP  Text Page

## 2020-02-06 NOTE — PLAN OF CARE
Pt obtunded.  Rattling secretions noted,  Suctioned secretions out of front of mouth, atropine given.  Roxanol given x2 for labored breathing,  Pt has cheyne-stoke breathing pattern, peroids of 30 second apnea.  Cool extremities and very grey skin.  Pt's family at bedside and supportive.  Continue with comfort measures.

## 2020-02-18 NOTE — DISCHARGE SUMMARY
[facility]    Death Summary - Hospitalist Service     Date of Admission:  2/1/2020  Date of Death: 2/6/2020  Discharging Provider: Jose Manuel Amin MD    Hospital Course   Nahun Han is a 89 year old male with past medical history significant for essential hypertension, distant history of prostate cancer with prostatectomy, Alzheimer's dementia, paroxysmal atrial fibrillation which was noticed during his last hospitalization currently not taking any home medications other than Flonase who was recently admitted to the hospital from January 17 to January 20 for symptomatic sinus pauses, bradycardia and S-A node dysfunction is admitted from emergency room this morning when he presented with shortness of breath and some chest discomfort and was found to have significantly elevated troponin concerning for possible non-STEMI and symptomatic bradycardia with decision made to transition to comfort care.     NSTEMI (non-ST elevated myocardial infarction) (H) (2/1/2020):   Initially on admission it was not clear if patient has non-STEMI versus type II non-STEMI from supply demand mismatch in the picture of bradycardia. Echocardiogram done during last hospitalization showed mild aortic root dilatation with estimated ejection fraction around 55 to 60% with mild TR and mild AR without any wall motion abnormalities. Echo done during this admission showed significant change with drop in ejection fraction to 30-35%, there is multisegment wall motion abnormalities concerning for ischemia versus a stress cardiomyopathy with severe pulmonary hypertension which is also new as well as moderate to severe aortic insufficiency.  Serial troponins peaked at 7.889.  Cardiology saw the patient and after discussion with the family it was decided to change to comfort cares on 2/2 overnight.  -transitioned to comfort cares and passed away comfortably on 2/6/2020     Severe symptomatic bradycardia:   Paroxysmal atrial  fibrillation:  Patient was recently admitted 2 weeks ago, at that time patient was evaluated in detail by EP and there was concern for patient post operative delirium due to patient being impulsive due to his underlying severe dementia and at risk for pulling the wires and not able to follow-up postoperative instructions well.  During his last hospitalization patient was also evaluated by palliative team but he did not qualify for hospice criteria at that time. According to EP his symptoms were most likely secondary to intrinsic conduction disease. After the hospitalization, patient was evaluated by cardiology, patient was noticed to have 30+ second run of bradycardia in 20s. Patient was started on dopamine and then transition to pressors due to low blood pressures.  After discussion with family patient was made comfort cares.  -transitioned to comfort cares and passed away comfortably on 2/6/2020     History of essential hypertension: During his last hospitalization due to patient presenting with acute kidney injury from dehydration and given his borderline blood pressure his lisinopril and hydrochlorothiazide were stopped.  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  Used to be in an assisted living facility, his wife did not like it and has subsequently transition him to home with his son in October 2019.  During his last hospitalization, initially patient was quite impulsive, needed sitter by bedside.  He was also evaluated by PT and OT who recommended home with 24-hour supervision. There were also long discussion with patient family regarding hospice and comfort care.  Family was open to meeting hospice and met with hospice team but patient did not meet criteria for hospice at that time. Patient was discharged home with family.  Home RN and home  for  home safety evaluation was ordered.  -transitioned to comfort cares and passed away comfortably on 2/6/2020     Prostate cancer (H) (10/6/2011): no ongoing active issues     Cause of death: Bradycardia secondary to Non ST Elevation MI  Other medical issues: Alzheimer's dementia     Jose Manuel Amin MD  [facility]  ______________________________________________________________________      Significant Results and Procedures   Most Recent 3 CBC's:  Recent Labs   Lab Test 02/02/20  0553 02/01/20  0855 02/01/20  0428   WBC 7.1 8.1 8.9   HGB 12.0* 11.9* 12.6*   MCV 95 94 95    203 210     Most Recent 3 BMP's:  Recent Labs   Lab Test 02/02/20  0553 02/01/20  0428 01/30/20  1203    142 139   POTASSIUM 4.7 4.4 4.6   CHLORIDE 113* 111* 108   CO2 27 28 29   BUN 15 14 14   CR 0.95 1.04 1.05   ANIONGAP 2* 3 2*   ASHISH 8.2* 8.7 8.5   * 112* 91     Most Recent 2 LFT's:  Recent Labs   Lab Test 01/30/20  1203 01/20/20  0541   AST 24 25   ALT 30 20   ALKPHOS 101 81   BILITOTAL 0.6 0.7     Most Recent 3 Troponin's:  Recent Labs   Lab Test 02/01/20  1436 02/01/20  0855 02/01/20  0428   TROPI 5.425* 6.904* 7.889*     Most Recent 3 BNP's:  Recent Labs   Lab Test 01/30/20  1203   NTBNPI 2,000*   ,   Results for orders placed or performed during the hospital encounter of 02/01/20   Chest XR,  PA & LAT    Narrative    EXAM: XR CHEST 2 VIEW  LOCATION: [facility]  DATE/TIME: 02/01/2020, 4:19 AM    INDICATION: Chest pain.  COMPARISON: None.      Impression    IMPRESSION: Patient is mildly rotated to the right. Normal heart size and pulmonary vascularity. No acute appearing infiltrates or consolidation. Tiny calcified granuloma left upper lobe. Tortuous and calcified thoracic aorta. No significant bony   abnormalities. Chest is otherwise negative. No definite acute findings.     CTA Chest with Contrast    Narrative    CTA CHEST WITH CONTRAST 2/1/2020 8:52 PM    HISTORY: 89-year-old patient with  thoracic aortic aneurysm. Patient  has acute aortic regurgitation and elevated troponin.    COMPARISON: None    TECHNIQUE: Multiplanar and multiformatted CTA images obtained from the  lung apices through the lung bases after the uneventful administration  of Isovue-370 intravenous contrast given for a total of 80 mL. Initial  images obtained demonstrate opacification of the right heart and  pulmonary arterial system without opacification of the aorta. This was  recognized by the technologist and additional imaging performed with  the aorta well-opacified. Radiation dose for this scan was reduced  using automated exposure control, adjustment of the mA and/or kV  according to patient size, or iterative reconstruction technique. 3-D  reformatted images created at a separate workstation.    FINDINGS: The visualized thyroid gland is unremarkable. No abnormally  enlarged mediastinal lymph nodes. Trace, if any pericardial effusion.  Trace bilateral pleural effusions. The visualized solid organs in the  upper abdomen are unremarkable. No acute compression deformity  throughout the thoracic spine. No acute osseous abnormality. Motion  artifact in portions of the lungs, though no obvious pulmonary mass.  Scattered bibasilar opacities most suggestive of atelectasis. No  pneumothorax.    No obvious filling defect in the pulmonary arterial system. Reflux of  contrast is noted into the IVC and the hepatic venous system  suggestive of right heart dysfunction, although clinical correlation  required. The main left pulmonary artery is enlarged measuring up to  3.1 cm. The main right pulmonary artery is enlarged measuring up to  3.3 cm.    The ascending thoracic aorta measures up to 4 cm AP x 3.8 cm  transverse in axial plane. The aortic root at the level of the sinuses  of Valsalva measures up to 4.5 cm in coronal plane and sinotubular  junction is 3.5 cm in coronal plane. Moderate atherosclerotic plaque  at the aortic arch, though  no stenosis. Origins of the great arteries  are widely patent. Descending thoracic aorta is 2.4 cm AP x 2.5 cm  transverse beyond the aortic arch. The distal descending thoracic  aorta is 2.1 cm AP x 2.3 cm transverse. Moderate atherosclerotic  plaque throughout the descending aortic segment. No dissection or  acute aortic pathologic.      Impression    IMPRESSION:  1. The ascending thoracic aorta measures up to 4 cm AP x 3.8 cm  transverse. Aortic root measures up to 4.5 cm.  2. No acute aortic pathology. Moderate atherosclerotic plaque  throughout the descending thoracic aorta, though size is normal and no  stenosis.  3. Trace bilateral pleural effusions.  4. Enlarged pulmonary arterial system and suggestion of right heart  dysfunction, clinically correlate with pulmonary artery hypertension  and/or right heart dysfunction.    EDY GORDILLO MD   Echocardiogram Limited    Narrative    [id number]  [facility]  [id number]  [id number]^KAROL^ROB           [facility]  Echocardiography Laboratory  [address] [place] [address]        Name: GINNY GONZALEZ  MRN: [id number]  : 1928  Study Date: 2020 02:08 PM  Age: 89 yrs  Gender: Male  Patient Location: [facility]  Reason For Study: Acute Myocardial Infarction  Ordering Physician: ROB ROBERSON  Referring Physician: Deuce Foy  Performed By: Colleen Alicea     BSA: 1.6 m2  Height: 66 in  Weight: 111 lb  HR: 71  BP: 119/65 mmHg  _____________________________________________________________________________  __     _____________________________________________________________________________  __        Interpretation Summary     Left ventricular systolic function is moderately reduced.  The visual ejection fraction is estimated at 30-35%.  Base-mid inferior and inferoseptal akinesis. Mid anterolateral, anteroseptal  and apical akinesis. No evidence of LV thrombus.  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  The right ventricle is normal in structure, function and size.  Severe (>55mmHg) pulmonary hypertension is present.  There is mod-severe to severe (3-4+) aortic regurgitation.  Severe TR.  The aortic root is not well visualized.  There is no pericardial effusion.     Compared to study dated 1/18/2020, there is significant drop in LV function  with above mentioned multisegment wall motion abnormalities concerning for  ischemia versus stress cardiomyopathy. There is now severe pulmonary  hypertension, as well as moderate-severe AI. The aortic root was not well  visualized. Given acute worsening in AI and prior known aortic aneurysm, would  consider CT angiogram chest and coronary evaluation.  _____________________________________________________________________________  __        Left Ventricle  The left ventricle is moderately dilated. There is normal left ventricular  wall thickness. The visual ejection fraction is estimated at 30-35%. Left  ventricular systolic function is moderately reduced. Base-mid inferior and  inferoseptal akinesis. Mid anterolateral, anteroseptal and apical akinesis. No  evidence of LV thrombus. 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     Right Ventricle  The right ventricle is normal in structure, function and size.     Atria  The left atrium is moderately dilated. Right atrial size is normal.     Mitral Valve  The mitral valve is normal in structure and function. There is trace mitral  regurgitation.        Tricuspid Valve  Normal tricuspid valve. The right ventricular systolic pressure is  approximated at 80.3 mmHg plus the right atrial pressure. Severe (>55mmHg)  pulmonary hypertension is present. There is severe (4+) tricuspid  regurgitation.     Aortic Valve  The aortic valve is trileaflet. There is mod-severe to severe (3-4+) aortic  regurgitation.     Pulmonic Valve  The pulmonic valve is normal in structure and  function.     Vessels  The aortic root is not well visualized. Dilation of the inferior vena cava is  present with abnormal respiratory variation in diameter. IVC diameter >2.1 cm  collapsing <50% with sniff suggests a high RA pressure estimated at 15 mmHg or  greater.     Pericardium  There is no pericardial effusion.     _____________________________________________________________________________  __  MMode/2D Measurements & Calculations  IVSd: 1.0 cm  LVIDd: 4.3 cm  LVIDs: 3.1 cm  LVPWd: 1.1 cm  FS: 26.9 %  LV mass(C)d: 156.7 grams  LV mass(C)dI: 100.6 grams/m2     RWT: 0.53        Doppler Measurements & Calculations  AI P1/2t: 271.5 msec  TR max alexandra: 402.7 cm/sec  TR max P.3 mmHg           _____________________________________________________________________________  __           Report approved by: Hung Allen 2020 03:06 PM      Cardiac Catheterization    Narrative      Successful temporary pacemaker insertion via RFV            Consultations This Hospital Stay   PHARMACY TO DOSE HEPARIN  SOCIAL WORK IP CONSULT  PHYSICAL THERAPY ADULT IP CONSULT  OCCUPATIONAL THERAPY ADULT IP CONSULT  CARDIAC REHAB IP CONSULT  CARDIOLOGY IP CONSULT  PHARMACY TO DOSE HEPARIN  PALLIATIVE CARE ADULT IP CONSULT  INTENSIVIST IP CONSULT  SOCIAL WORK IP CONSULT  SOCIAL WORK IP CONSULT  SMOKING CESSATION PROGRAM IP CONSULT    Primary Care Physician   Deuce Foy    Time Spent on this Encounter   I, Jose Manuel Amin MD, discharged this patient today but I did not personally see the patient today and will not be billing for the patient's discharge.
